# Patient Record
Sex: MALE | Race: WHITE | NOT HISPANIC OR LATINO | URBAN - METROPOLITAN AREA
[De-identification: names, ages, dates, MRNs, and addresses within clinical notes are randomized per-mention and may not be internally consistent; named-entity substitution may affect disease eponyms.]

---

## 2018-03-30 ENCOUNTER — OUTPATIENT (OUTPATIENT)
Dept: OUTPATIENT SERVICES | Facility: HOSPITAL | Age: 58
LOS: 1 days | End: 2018-03-30
Payer: COMMERCIAL

## 2018-03-30 ENCOUNTER — APPOINTMENT (OUTPATIENT)
Dept: MRI IMAGING | Facility: IMAGING CENTER | Age: 58
End: 2018-03-30
Payer: COMMERCIAL

## 2018-03-30 DIAGNOSIS — Z00.8 ENCOUNTER FOR OTHER GENERAL EXAMINATION: ICD-10-CM

## 2018-03-30 PROCEDURE — 74183 MRI ABD W/O CNTR FLWD CNTR: CPT | Mod: 26

## 2018-03-30 PROCEDURE — 74183 MRI ABD W/O CNTR FLWD CNTR: CPT

## 2018-03-30 PROCEDURE — A9585: CPT

## 2019-03-20 ENCOUNTER — APPOINTMENT (OUTPATIENT)
Dept: GASTROENTEROLOGY | Facility: CLINIC | Age: 59
End: 2019-03-20
Payer: COMMERCIAL

## 2019-03-20 VITALS
HEIGHT: 68 IN | HEART RATE: 63 BPM | DIASTOLIC BLOOD PRESSURE: 80 MMHG | RESPIRATION RATE: 15 BRPM | SYSTOLIC BLOOD PRESSURE: 124 MMHG | OXYGEN SATURATION: 98 % | TEMPERATURE: 97.6 F | BODY MASS INDEX: 28.79 KG/M2 | WEIGHT: 190 LBS

## 2019-03-20 DIAGNOSIS — I10 ESSENTIAL (PRIMARY) HYPERTENSION: ICD-10-CM

## 2019-03-20 DIAGNOSIS — Z78.9 OTHER SPECIFIED HEALTH STATUS: ICD-10-CM

## 2019-03-20 DIAGNOSIS — R10.11 RIGHT UPPER QUADRANT PAIN: ICD-10-CM

## 2019-03-20 PROCEDURE — 99204 OFFICE O/P NEW MOD 45 MIN: CPT

## 2019-03-20 RX ORDER — NEBIVOLOL HYDROCHLORIDE 5 MG/1
5 TABLET ORAL
Qty: 30 | Refills: 0 | Status: ACTIVE | COMMUNITY
Start: 2019-03-18

## 2019-03-20 RX ORDER — TELMISARTAN 40 MG/1
40 TABLET ORAL
Qty: 90 | Refills: 0 | Status: ACTIVE | COMMUNITY
Start: 2019-03-11

## 2019-03-20 NOTE — HISTORY OF PRESENT ILLNESS
[FreeTextEntry1] : Office consultation on 3/20/19.\par \par The patient is a 59-year-old man evaluated for consultation regarding chronic right upper quadrant abdominal pain. PMD: Dr. Kings Castanon.\par \par The patient has experienced intermittent right upper quadrant abdominal pain of 10 years duration. However, over the past approximate 3 years the pain has been constant. Of note, over the past 2 weeks the pain went away except recurred yesterday.\par \par For the past 3 years the patient has experienced a constant somewhat focal and localized right upper quadrant abdominal pain radiating around to the back. As noted, this is constant 24/7. He localizes the pain to a somewhat focal and localized area at the right upper quadrant several centimeters to the right of the midline just inferior to the costal margin. However, the pain is not at the costal margin or rib margin per se. The pain is mostly of moderate severity rated at 5/10. He indicates it can be exacerbated by motion, golfing, shoveling and other exertion. The patient was suspicious that there was a relation to caffeine products such as coffee as well as soda. Of note, upon stopping those items for the past 2 weeks he indicates the pain was gone. However, he had soda yesterday and the pain has recurred. He is experiencing the pain today both at the right upper quadrant as well as the right lower back area.\par \par He can occasionally experience some nausea but otherwise reports no other associated symptoms. Denies fever. Appetite is stable and he reports recent 10 pound weight decrease related to evaluation and treatment for hypertension and cardiac evaluation. He reports no associated vomiting. He denies any complaints of heartburn, regurgitation or abdominal pain at any other site. He typically has a bowel movement daily to every other day with occasional mild constipation. Denies any change in bowel habit, diarrhea or rectal bleeding.\par \par The patient has been evaluated by gastroenterology. He underwent an ultrasound of the abdomen on 3/29/18 which revealed a normal gallbladder, bile duct and visualize pancreas. There were no gallstones and CBD measured 2 mm. Mild splenomegaly was noted.\par \par MRI of the abdomen on 3/30/18 was normal. Liver, gallbladder, CBD and pancreas were normal. CBD measured 3 mm. Gallstone or bile duct stones were not detected.\par \par Review of 2/15/19 labs:\par -CBC: WBC 5100, hemoglobin 14.9, hematocrit 44.7, platelet count 222,000.\par -CMP: normal. Creatinine 22. Normal liver enzymes. ALT 14.\par -TSH 2.3.\par \par The patient reports no other history of digestive disease except as noted. The patient has never had a colonoscopy. There is no family history of colon cancer. The patient indicates that both parents underwent cholecystectomy.

## 2019-03-20 NOTE — REASON FOR VISIT
[Consultation] : a consultation visit [FreeTextEntry1] : for evaluation of right upper quadrant abdominal pain.

## 2019-03-20 NOTE — CONSULT LETTER
[Dear  ___] : Dear  [unfilled], [( Thank you for referring [unfilled] for consultation for _____ )] : Thank you for referring [unfilled] for consultation for [unfilled] [Consult Letter:] : I had the pleasure of evaluating your patient, [unfilled]. [Please see my note below.] : Please see my note below. [Consult Closing:] : Thank you very much for allowing me to participate in the care of this patient.  If you have any questions, please do not hesitate to contact me. [Sincerely,] : Sincerely, [FreeTextEntry3] : Dominic Middleton M.D. [FreeTextEntry2] : Dr. Kings Castanon

## 2019-03-20 NOTE — ASSESSMENT
[FreeTextEntry1] : Impression:\par \par #1 right upper quadrant abdominal pain-chronic somewhat focal and localized pain that has been constant and chronic of years duration. Atypical for gallbladder etiology-not experiencing typical biliary colic or typical biliary symptoms. In addition, also not suggestive of pancreatic etiology or PUD. Suspicion of abdominal wall etiology is prompted by exacerbation of pain associated with exertion and motion. However, no tenderness elicited at exam and therefore not able to assess with Carnet's test. He recently reports  cessation of pain related to avoidance of caffeine, coffee and soda although not clear if this is a coincidence. Alarm symptoms not reported.\par \par #2 colon cancer screening-advise colonoscopy.\par \par #3 hypertension.

## 2019-03-20 NOTE — PHYSICAL EXAM
[General Appearance - Alert] : alert [General Appearance - In No Acute Distress] : in no acute distress [General Appearance - Well Nourished] : well nourished [General Appearance - Well Developed] : well developed [General Appearance - Well-Appearing] : healthy appearing [Sclera] : the sclera and conjunctiva were normal [Oropharynx] : the oropharynx was normal [Neck Appearance] : the appearance of the neck was normal [Neck Cervical Mass (___cm)] : no neck mass was observed [Respiration, Rhythm And Depth] : normal respiratory rhythm and effort [Auscultation Breath Sounds / Voice Sounds] : lungs were clear to auscultation bilaterally [Exaggerated Use Of Accessory Muscles For Inspiration] : no accessory muscle use [Heart Rate And Rhythm] : heart rate was normal and rhythm regular [Heart Sounds] : normal S1 and S2 [Heart Sounds Gallop] : no gallops [Murmurs] : no murmurs [Heart Sounds Pericardial Friction Rub] : no pericardial rub [Edema] : there was no peripheral edema [Bowel Sounds] : normal bowel sounds [Abdomen Soft] : soft [Abdomen Tenderness] : non-tender [] : no hepato-splenomegaly [Abdomen Mass (___ Cm)] : no abdominal mass palpated [Abdomen Hernia] : no hernia was discovered [Cervical Lymph Nodes Enlarged Posterior Bilaterally] : posterior cervical [Cervical Lymph Nodes Enlarged Anterior Bilaterally] : anterior cervical [Supraclavicular Lymph Nodes Enlarged Bilaterally] : supraclavicular [No CVA Tenderness] : no ~M costovertebral angle tenderness [Abnormal Walk] : normal gait [Nail Clubbing] : no clubbing  or cyanosis of the fingernails [Skin Color & Pigmentation] : normal skin color and pigmentation [Oriented To Time, Place, And Person] : oriented to person, place, and time [Impaired Insight] : insight and judgment were intact [Affect] : the affect was normal [Mood] : the mood was normal [FreeTextEntry1] : Patient localizes site of pain to a focal and localized area at the right upper quadrant several centimeters lateral to midline and just inferior to costal margin. However, tenderness is not elicited to deep palpation. Rib tenderness is not elicited. There is no mass or hepatosplenomegaly. No change in discomfort is reported with tensing of abdominal muscles

## 2019-11-13 ENCOUNTER — APPOINTMENT (OUTPATIENT)
Age: 59
End: 2019-11-13
Payer: COMMERCIAL

## 2019-11-13 ENCOUNTER — TRANSCRIPTION ENCOUNTER (OUTPATIENT)
Age: 59
End: 2019-11-13

## 2019-11-13 VITALS — WEIGHT: 193 LBS | HEIGHT: 68 IN | BODY MASS INDEX: 29.25 KG/M2

## 2019-11-13 DIAGNOSIS — Z86.39 PERSONAL HISTORY OF OTHER ENDOCRINE, NUTRITIONAL AND METABOLIC DISEASE: ICD-10-CM

## 2019-11-13 DIAGNOSIS — Z78.9 OTHER SPECIFIED HEALTH STATUS: ICD-10-CM

## 2019-11-13 DIAGNOSIS — M25.561 PAIN IN RIGHT KNEE: ICD-10-CM

## 2019-11-13 PROCEDURE — 73562 X-RAY EXAM OF KNEE 3: CPT | Mod: RT

## 2019-11-13 PROCEDURE — 20610 DRAIN/INJ JOINT/BURSA W/O US: CPT | Mod: RT

## 2019-11-13 PROCEDURE — 99204 OFFICE O/P NEW MOD 45 MIN: CPT | Mod: 25

## 2019-11-13 PROCEDURE — 73560 X-RAY EXAM OF KNEE 1 OR 2: CPT | Mod: LT

## 2019-11-13 NOTE — ADDENDUM
[FreeTextEntry1] : This note was written by Annette Garcia on 11/13/2019 acting as scribe for Dr. Jarad Montoya M.D.\par \par I, Dr. Jarad Montoya M.D., have read and attest that all the information, medical decision making and discharge instructions within are true and accurate.

## 2019-11-13 NOTE — HISTORY OF PRESENT ILLNESS
[de-identified] : 59 year old male presents for initial evaluation of right knee pain for 10 years following a twisting injury while coming down a ladder. Patient reports he was diagnosed with a medial meniscus tear and subsequently underwent a right knee surgical arthroscopy partial medial meniscectomy in 2009. This was followed by right knee PT and steroid injections in the months following surgery, but none recently. For the past few years he has been self treating with a [employer] brace. His pain is located diffusely and mostly medial, which is described as sharp and dull intermittently. He endorses swelling, clicking, and popping. Patient takes the stairs one at a time using the handrail with difficulty. Reports discomfort walking more than 5 blocks, but does have to walk close to 10,000-12,000 steps per day as [place]. Today, he would like to discuss his treatment options with Dr. Montoya.

## 2019-11-13 NOTE — PROCEDURE
[de-identified] : RIGHT KNEE CORTISONE INJECTION\par Discussed at length with the patient the planned steroid and lidocaine injection. The risks, benefits, convalescence and alternatives were reviewed. The possible side effects discussed included but were not limited to: pain, swelling, heat and redness. There symptoms are generally mild but if they are extensive then contact the office. Giving pain relievers by mouth such as NSAID’s or Tylenol can generally treat the reactions to  steroid and lidocaine. Rare cases of infection have been noted. Rash, hives and itching may occur post injection. If you have muscle pain or cramps, flushing and or swelling of the face, rapid heart beat, nausea, dizziness, fever, chills, headache, difficulty breathing, swelling in the arms or legs, or have a prickly feeling of your skin, contact a health care provider immediately.\par  \par Following this discussion, the knee was prepped with betadine and under sterile conditions 9 cc of 1% lidocaine and 1 cc (40 mg) of Depo-Medrol were injected with a 21 gauge needle. The needle was introduced into the joint, aspiration was performed to ensure intra-articular placement and the medication was injected. Upon withdrawal of the needle the site was cleaned with alcohol and a bandaid applied. The patient tolerated the injection well and there were no adverse effects. Post injection instructions included no strenuous activity for 24 hours, cryotherapy and if there are any adverse effects to contact the office.

## 2019-11-13 NOTE — DISCUSSION/SUMMARY
[de-identified] : Discussed at length the nature of the patients condition. Their right knee symptoms appear secondary to degenerative arthritis with varus deformity. We reviewed operative and nonoperative treatment. While I believe he would eventually benefit from a right TKR, he is hesitant to have surgery at this time. Therefore, we will continue nonoperatively. We will seek authorization for RIGHT knee Euflexxa injections. Once we receive authorization, we will proceed accordingly. In the interim, patient was given a RIGHT knee cortisone injection today as detailed above for symptomatic relief. I also suggested PT and Advil or Aleve prn. They can continue activities as tolerated.\par

## 2019-11-13 NOTE — PHYSICAL EXAM
[de-identified] : General appearance: well nourished and hydrated, pleasant, alert and oriented x 3, cooperative.\par HEENT: Normocephalic, EOM intact, Nasal septum midline, Oral cavity clear, External auditory canal clear.\par Cardiovascular: no apparent abnormalities, no lower leg edema, no varicosities, pedal pulses are palpable.\par Lymphatics Lymph nodes: none palpated, Lymphedema: not present.\par Neurologic: sensation is normal, no muscle weakness in upper or lower extremities, patella tendon reflexes intact .\par Dermatologic no apparent skin lesions, moist, warm, no rash.\par Spine: cervical spine appears normal and moves freely, thoracic spine appears normal and moves freely, lumbosacral spine appears normal and moves freely.\par Gait: right lateral thrust\par \par Left knee\par Inspection: no effusion or erythema.\par Wounds: none.\par Alignment: normal.\par Palpation: no specific tenderness on palpation.\par ROM active (in degrees): 0-135\par Ligamentous laxity: all ligaments appear stable,, negative ant. drawer test, negative post. drawer test, stable to varus stress test, stable to valgus stress test. negative Lachman's test, negative pivot shift test\par Meniscal Test: negative McMurrays, negative Michael.\par Patellofemoral Alignment Test: Q angle-, normal.\par Muscle Test: good quad strength.\par Leg examination: calf is soft and non-tender.\par \par Right knee\par Inspection: mild effusion, popliteal cyst noted \par Wounds: none.\par Alignment: 10 degrees varus alignment \par Palpation: medial tenderness on palpation.\par ROM active (in degrees): 0-125 with crepitus and pain through the arc of motion \par Ligamentous laxity: all ligaments appear stable,, negative ant. drawer test, negative post. drawer test, stable to varus stress test, stable to valgus stress test. negative Lachman's test, negative pivot shift test\par Meniscal Test: negative McMurrays, negative Michael.\par Patellofemoral Alignment Test: Q angle-, normal.\par Muscle Test: good quad strength.\par Leg examination: calf is soft and non-tender.\par \par Left hip\par Inspection: No swelling or ecchymosis.\par Wounds: none.\par Palpation: non-tender.\par Stability: no instability.\par Strength: 5/5 all motor groups.\par ROM: no pain with FROM.\par Leg length: equal.\par \par Right hip\par Inspection: No swelling or ecchymosis.\par Wounds: none.\par Palpation: non-tender.\par Stability: no instability.\par Strength: 5/5 all motor groups.\par ROM: no pain with FROM.\par Leg length: equal.\par \par Left ankle\par Inspection: no erythema noted, no swelling noted.\par Palpation: no pain on palpation .\par ROM: FROM without crepitus.\par Muscle strength: 5/5.\par Stability: no instability noted.\par \par Right ankle\par Inspection: no erythema noted, no swelling noted.\par ROM: FROM without crepitus.\par Palpation: no pain on palpation .\par Muscle strength: 5/5.\par Stability: no instability noted.\par \par Left foot\par Inspection: color, texture and turgor are normal.\par ROM: full range of motion of all joints without pain or crepitus.\par Palpation: no tenderness.\par Stability: no instability noted.\par \par Right foot\par Inspection: color, texture and turgor are normal.\par ROM: full range of motion of all joints without pain or crepitus.\par Palpation: no tenderness.\par Stability: no instability noted.\par \par Left shoulder\par Inspection: no muscle asymmetry, no atrophy.\par Palpation: no tenderness noted, ACJ non-tender.\par ROM: full active ROM, full passive ROM.\par Strength testing): anterior deltoid, supraspinatus, infraspinatus, subscapularis all 5/5.\par Stability test: ant. apprehension negative, post. apprehension negative, relocation test negative.\par Impingement Test: negative NEER.\par \par Right shoulder\par Inspection: no muscle asymmetry, no atrophy.\par Palpation: no tenderness noted, ACJ non-tender.\par ROM: full active ROM, full passive ROM.\par Strength testing): anterior deltoid, supraspinatus, infraspinatus, subscapularis all 5/5.\par Stability test: ant. apprehension negative, post. apprehension negative, relocation test negative.\par Impingement Test: negative NEER.\par Surgical Wounds: none.\par \par Left elbow\par Inspection: negative swelling.\par Wounds: none.\par Palpation: non-tender.\par ROM: full ROM.\par Strength: 5/5 all groups.\par Stability: no instability.\par Mass: none.\par \par Right elbow\par Inspection: negative swelling.\par Wounds: none.\par Palpation: non-tender.\par ROM: full ROM.\par Strength: 5/5 all groups.\par Stability: no instability.\par Mass: none.\par \par Left wrist\par Inspection: negative swelling.\par Wound: none.\par Palpation (bone): no tenderness.\par ROM: full ROM.\par Strength: full , good.\par \par Right wrist\par Inspection: negative swelling.\par Wound: none.\par Palpation (bone): no tenderness.\par ROM: full ROM.\par Strength: full , good.\par \par Left hand\par Inspection: no skin changes, normal appearance.\par Wounds: none.\par Strength: full , able to make full fist.\par Sensation: light touch intact all fingers and thumb.\par Vascular: good capillary refill < 3 seconds, all fingers and thumb.\par Mass: none.\par \par Right hand\par Inspection: no skin changes, normal appearance. \par Wounds: none.\par Palpation: non-tender throughout.\par Strength: full , able to make full fist.\par Sensation: light touch intact all fingers and thumb.\par Vascular: good capillary refill < 3 seconds, all fingers and thumb.\par Mass: none.\par   [de-identified] : Right knee xrays, standing AP/Lateral, Merchant films, and 45 degree PA standing view taken at the office today show diffuse tricompartmental degenerative arthritis, medial joint space narrowing, marginal osteophytes, sclerosis, bone on bone, patellofemoral joint space narrowing with osteophyte formation consistent with patellofemoral arthritis, Kellgren and Eduardo grade 4, varus deformity  \par \par Left knee xray merchant view, taken at the office today demonstrates good joint space and a well centered patella.

## 2019-12-11 ENCOUNTER — APPOINTMENT (OUTPATIENT)
Dept: ORTHOPEDIC SURGERY | Facility: CLINIC | Age: 59
End: 2019-12-11
Payer: COMMERCIAL

## 2019-12-11 VITALS — HEIGHT: 68 IN | WEIGHT: 193 LBS | BODY MASS INDEX: 29.25 KG/M2

## 2019-12-11 PROCEDURE — 20610 DRAIN/INJ JOINT/BURSA W/O US: CPT | Mod: RT

## 2019-12-11 NOTE — PROCEDURE
[de-identified] : Euflexxa # 1 Right Knee\par Discussed at length with the patient the planned Euflexxa injection. The risks, benefits, convalescence and alternatives were reviewed. The possible side effects discussed included but were not limited to: pain, swelling, heat and redness. There symptoms are generally mild but if they are extensive then contact the office. Giving pain relievers by mouth such as NSAID’s or Tylenol can generally treat the reactions to Euflexxa. Rare cases of infection have been noted. Rash, hives and itching may occur post injection. If you have muscle pain or cramps, flushing and or swelling of the face, rapid heart beat, nausea, dizziness, fever, chills, headache, difficulty breathing, swelling in the arms or legs, or have a prickly feeling of your skin, contact a health care provider immediately.\par \par Following this discussion, the knee was prepped with betadine and under sterile condition the Euflexxa injection was performed with a 22 gauge needle. The needle was introduced into the joint, aspiration was performed to ensure intra-articular placement and the medication was injected. Upon withdrawal of the needle the site was cleaned with alcohol and a bandaid applied. The patient tolerated the injection well and there were no adverse effects. Post injection instructions included no strenuous activity for 24 hours, cryotherapy and if there are any adverse effects to contact the office.\par

## 2019-12-18 ENCOUNTER — APPOINTMENT (OUTPATIENT)
Dept: ORTHOPEDIC SURGERY | Facility: CLINIC | Age: 59
End: 2019-12-18
Payer: COMMERCIAL

## 2019-12-18 VITALS — WEIGHT: 193 LBS | HEIGHT: 68 IN | BODY MASS INDEX: 29.25 KG/M2

## 2019-12-18 PROCEDURE — 20610 DRAIN/INJ JOINT/BURSA W/O US: CPT | Mod: RT

## 2019-12-18 NOTE — PROCEDURE
[de-identified] : Euflexxa # 2 Right knee\par Discussed at length with the patient the planned Euflexxa injection. The risks, benefits, convalescence and alternatives were reviewed. The possible side effects discussed included but were not limited to: pain, swelling, heat and redness. There symptoms are generally mild but if they are extensive then contact the office. Giving pain relievers by mouth such as NSAID’s or Tylenol can generally treat the reactions to Euflexxa. Rare cases of infection have been noted. Rash, hives and itching may occur post injection. If you have muscle pain or cramps, flushing and or swelling of the face, rapid heart beat, nausea, dizziness, fever, chills, headache, difficulty breathing, swelling in the arms or legs, or have a prickly feeling of your skin, contact a health care provider immediately.\par \par Following this discussion, the knee was prepped with betadine and under sterile condition the Euflexxa injection was performed with a 22 gauge needle. The needle was introduced into the joint, aspiration was performed to ensure intra-articular placement and the medication was injected. Upon withdrawal of the needle the site was cleaned with alcohol and a bandaid applied. The patient tolerated the injection well and there were no adverse effects. Post injection instructions included no strenuous activity for 24 hours, cryotherapy and if there are any adverse effects to contact the office.\par

## 2019-12-27 ENCOUNTER — APPOINTMENT (OUTPATIENT)
Dept: ORTHOPEDIC SURGERY | Facility: CLINIC | Age: 59
End: 2019-12-27
Payer: COMMERCIAL

## 2019-12-27 VITALS — BODY MASS INDEX: 29.25 KG/M2 | HEIGHT: 68 IN | WEIGHT: 193 LBS

## 2019-12-27 PROCEDURE — 20610 DRAIN/INJ JOINT/BURSA W/O US: CPT | Mod: RT

## 2019-12-27 NOTE — PROCEDURE
[de-identified] : Euflexxa # 3 Right Knee\par Discussed at length with the patient the planned Euflexxa injection. The risks, benefits, convalescence and alternatives were reviewed. The possible side effects discussed included but were not limited to: pain, swelling, heat and redness. There symptoms are generally mild but if they are extensive then contact the office. Giving pain relievers by mouth such as NSAID’s or Tylenol can generally treat the reactions to Euflexxa. Rare cases of infection have been noted. Rash, hives and itching may occur post injection. If you have muscle pain or cramps, flushing and or swelling of the face, rapid heart beat, nausea, dizziness, fever, chills, headache, difficulty breathing, swelling in the arms or legs, or have a prickly feeling of your skin, contact a health care provider immediately.\par \par Following this discussion, the knee was prepped with betadine and under sterile condition the Euflexxa injection was performed with a 22 gauge needle. The needle was introduced into the joint, aspiration was performed to ensure intra-articular placement and the medication was injected. Upon withdrawal of the needle the site was cleaned with alcohol and a bandaid applied. The patient tolerated the injection well and there were no adverse effects. Post injection instructions included no strenuous activity for 24 hours, cryotherapy and if there are any adverse effects to contact the office.\par

## 2020-03-18 ENCOUNTER — APPOINTMENT (OUTPATIENT)
Dept: ORTHOPEDIC SURGERY | Facility: CLINIC | Age: 60
End: 2020-03-18

## 2020-07-15 ENCOUNTER — APPOINTMENT (OUTPATIENT)
Dept: ORTHOPEDIC SURGERY | Facility: CLINIC | Age: 60
End: 2020-07-15
Payer: COMMERCIAL

## 2020-07-15 VITALS — TEMPERATURE: 97.2 F

## 2020-07-15 PROCEDURE — 73560 X-RAY EXAM OF KNEE 1 OR 2: CPT | Mod: LT

## 2020-07-15 PROCEDURE — 99213 OFFICE O/P EST LOW 20 MIN: CPT | Mod: 25

## 2020-07-15 PROCEDURE — 73564 X-RAY EXAM KNEE 4 OR MORE: CPT | Mod: RT

## 2020-07-15 PROCEDURE — 20610 DRAIN/INJ JOINT/BURSA W/O US: CPT | Mod: RT

## 2020-07-15 NOTE — HISTORY OF PRESENT ILLNESS
[de-identified] : 60 year old male presents for follow-up evaluation of right knee pain. He received a Cortisone injection in November 2019 and Euflexxa in December 2019 with noted improvement. He was attending PT regularly until COVID-19 which he discontinued. He notes increased pain and is requesting Euflexxa and Cortisone injection to help relieve his  pain after discontinuing PT. Patient denies taking any medications for any pain he might experience.

## 2020-07-15 NOTE — ADDENDUM
[FreeTextEntry1] : This note was written by Wayne Fraser on 07/15/2020 acting as scribe for Dr. Jarad Montoya M.D.\par \par I, Dr. Jarad Montoya, have read and attest that all the information, medical decision making and discharge instructions within are true and accurate.

## 2020-07-15 NOTE — DISCUSSION/SUMMARY
[de-identified] : Discussed at length the nature of the patients condition. Their right knee symptoms appear secondary to degenerative arthritis with varus deformity. We reviewed operative and nonoperative treatment. While I believe he would eventually benefit from a right TKR, he is hesitant to have surgery at this time. Therefore, we will continue nonoperatively. We will seek authorization for RIGHT knee Euflexxa injections. Once we receive authorization, we will proceed accordingly. In the interim, patient was given a RIGHT knee cortisone injection today as detailed above for symptomatic relief. I also suggested PT and Advil or Aleve prn. They can continue activities as tolerated.\par

## 2020-07-15 NOTE — PHYSICAL EXAM
[de-identified] : General appearance: well nourished and hydrated, pleasant, alert and oriented x 3, cooperative.\par HEENT: Normocephalic, EOM intact, Nasal septum midline, Oral cavity clear, External auditory canal clear.\par Cardiovascular: no apparent abnormalities, no lower leg edema, no varicosities, pedal pulses are palpable.\par Lymphatics Lymph nodes: none palpated, Lymphedema: not present.\par Neurologic: sensation is normal, no muscle weakness in upper or lower extremities, patella tendon reflexes intact .\par Dermatologic no apparent skin lesions, moist, warm, no rash.\par Spine: cervical spine appears normal and moves freely, thoracic spine appears normal and moves freely, lumbosacral spine appears normal and moves freely.\par Gait: right lateral thrust\par \par Right knee\par Inspection: mild effusion, popliteal cyst noted \par Wounds: none.\par Alignment: 10 degrees varus alignment \par Palpation: medial tenderness on palpation.\par ROM active (in degrees): 0-125 with crepitus and pain through the arc of motion \par Ligamentous laxity: all ligaments appear stable,, negative ant. drawer test, negative post. drawer test, stable to varus stress test, stable to valgus stress test. negative Lachman's test, negative pivot shift test\par Meniscal Test: negative McMurrays, negative Michael.\par Patellofemoral Alignment Test: Q angle-, normal.\par Muscle Test: good quad strength.\par Leg examination: calf is soft and non-tender. [de-identified] : Right knee xrays, standing AP/Lateral, Merchant films, and 45 degree PA standing view taken at the office today show diffuse tricompartmental degenerative arthritis, medial joint space narrowing, marginal osteophytes, sclerosis, bone on bone, patellofemoral joint space narrowing with osteophyte formation consistent with patellofemoral arthritis, Kellgren and Eduardo grade 4, varus deformity  \par \par Left knee xray merchant view, taken at the office today demonstrates good joint space and a well centered patella.

## 2020-08-12 ENCOUNTER — APPOINTMENT (OUTPATIENT)
Dept: ORTHOPEDIC SURGERY | Facility: CLINIC | Age: 60
End: 2020-08-12
Payer: COMMERCIAL

## 2020-08-12 VITALS — BODY MASS INDEX: 29.25 KG/M2 | HEIGHT: 68 IN | WEIGHT: 193 LBS

## 2020-08-12 PROCEDURE — 20610 DRAIN/INJ JOINT/BURSA W/O US: CPT | Mod: RT

## 2020-08-12 NOTE — PROCEDURE
[de-identified] : Orthovisc #1 Right knee\par \par Discussed at length with the patient the planned Orthovisc injection. The risks, benefits, convalescence and alternatives were reviewed. The possible side effects discussed included but were not limited to: pain, swelling, heat and redness. There symptoms are generally mild but if they are extensive then contact the office. Giving pain relievers by mouth such as NSAID’s or Tylenol can generally treat the reactions to Orthovisc. Rare cases of infection have been noted. Rash, hives and itching may occur post injection. If you have muscle pain or cramps, flushing and or swelling of the face, rapid heart beat, nausea, dizziness, fever, chills, headache, difficulty breathing, swelling in the arms or legs, or have a prickly feeling of your skin, contact a health care provider immediately.\par \par Following this discussion, the knee was prepped with betadine and under sterile condition the Orthovisc injection was performed with a 21 gauge needle. The needle was introduced into the joint, aspiration was performed to ensure intra-articular placement and the medication was injected. Upon withdrawal of the needle the site was cleaned with alcohol and a bandaid applied. The patient tolerated the injection well and there were no adverse effects. Post injection instructions included no strenuous activity for 24 hours, cryotherapy and if there are any adverse effects to contact the office.\par

## 2020-08-19 ENCOUNTER — APPOINTMENT (OUTPATIENT)
Dept: ORTHOPEDIC SURGERY | Facility: CLINIC | Age: 60
End: 2020-08-19
Payer: COMMERCIAL

## 2020-08-19 VITALS — BODY MASS INDEX: 29.25 KG/M2 | WEIGHT: 193 LBS | HEIGHT: 68 IN

## 2020-08-19 PROCEDURE — 20610 DRAIN/INJ JOINT/BURSA W/O US: CPT | Mod: RT

## 2020-08-19 NOTE — PROCEDURE
[de-identified] : Orthovisc # 2 Right Knee\par Discussed at length with the patient the planned Orthovisc injection. The risks, benefits, convalescence and alternatives were reviewed. The possible side effects discussed included but were not limited to: pain, swelling, heat and redness. There symptoms are generally mild but if they are extensive then contact the office. Giving pain relievers by mouth such as NSAID’s or Tylenol can generally treat the reactions to Orthovisc. Rare cases of infection have been noted. Rash, hives and itching may occur post injection. If you have muscle pain or cramps, flushing and or swelling of the face, rapid heart beat, nausea, dizziness, fever, chills, headache, difficulty breathing, swelling in the arms or legs, or have a prickly feeling of your skin, contact a health care provider immediately.\par \par Following this discussion, the knee was prepped with betadine and under sterile condition the Orthovisc injection was performed with a 22 gauge needle. The needle was introduced into the joint, aspiration was performed to ensure intra-articular placement and the medication was injected. Upon withdrawal of the needle the site was cleaned with alcohol and a bandaid applied. The patient tolerated the injection well and there were no adverse effects. Post injection instructions included no strenuous activity for 24 hours, cryotherapy and if there are any adverse effects to contact the office.\par

## 2020-08-26 ENCOUNTER — APPOINTMENT (OUTPATIENT)
Dept: ORTHOPEDIC SURGERY | Facility: CLINIC | Age: 60
End: 2020-08-26
Payer: COMMERCIAL

## 2020-08-26 VITALS — WEIGHT: 193 LBS | HEIGHT: 68 IN | BODY MASS INDEX: 29.25 KG/M2

## 2020-08-26 PROCEDURE — 20610 DRAIN/INJ JOINT/BURSA W/O US: CPT | Mod: RT

## 2020-08-26 NOTE — PROCEDURE
[de-identified] : Orthovisc # 3 Right Knee\par Discussed at length with the patient the planned Orthovisc injection. The risks, benefits, convalescence and alternatives were reviewed. The possible side effects discussed included but were not limited to: pain, swelling, heat and redness. There symptoms are generally mild but if they are extensive then contact the office. Giving pain relievers by mouth such as NSAID’s or Tylenol can generally treat the reactions to Orthovisc. Rare cases of infection have been noted. Rash, hives and itching may occur post injection. If you have muscle pain or cramps, flushing and or swelling of the face, rapid heart beat, nausea, dizziness, fever, chills, headache, difficulty breathing, swelling in the arms or legs, or have a prickly feeling of your skin, contact a health care provider immediately.\par \par Following this discussion, the knee was prepped with betadine and under sterile condition the Orthovisc injection was performed with a 22 gauge needle. The needle was introduced into the joint, aspiration was performed to ensure intra-articular placement and the medication was injected. Upon withdrawal of the needle the site was cleaned with alcohol and a bandaid applied. The patient tolerated the injection well and there were no adverse effects. Post injection instructions included no strenuous activity for 24 hours, cryotherapy and if there are any adverse effects to contact the office.\par

## 2021-01-27 ENCOUNTER — APPOINTMENT (OUTPATIENT)
Dept: ORTHOPEDIC SURGERY | Facility: CLINIC | Age: 61
End: 2021-01-27
Payer: COMMERCIAL

## 2021-01-27 PROCEDURE — 99072 ADDL SUPL MATRL&STAF TM PHE: CPT

## 2021-01-27 PROCEDURE — 73564 X-RAY EXAM KNEE 4 OR MORE: CPT | Mod: RT

## 2021-01-27 PROCEDURE — 20610 DRAIN/INJ JOINT/BURSA W/O US: CPT | Mod: RT

## 2021-01-27 PROCEDURE — 73560 X-RAY EXAM OF KNEE 1 OR 2: CPT | Mod: LT

## 2021-01-27 PROCEDURE — 99212 OFFICE O/P EST SF 10 MIN: CPT | Mod: 25

## 2021-01-27 NOTE — DISCUSSION/SUMMARY
[de-identified] : Discussed at length the nature of the patients condition. Their right knee symptoms appear secondary to degenerative arthritis with varus deformity. I reviewed x-ray films with them. \par \par We reviewed operative and nonoperative treatment, including observation, NSAIDS, injection(s), physical therapy, and surgical intervention. We will continue non-operatively. \par \par Since he has had a good response in the past, we will seek authorization for R knee Euflexxa injections. Once we receive authorization, we will proceed accordingly. Patient was given a R knee cortisone injection as detailed above for symptomatic relief. \par \par I have suggested a home exercise program, RICE therapy and Advil or Aleve prn.\par \par He can continue activities as tolerated. All questions answered, understanding verbalized. Patient in agreement with plan of care. Discussed with the patient that if his symptoms continue to persist, and becomes a functional disability while impacting his quality of life, he will eventually have to consider a total knee replacement.

## 2021-01-27 NOTE — HISTORY OF PRESENT ILLNESS
[de-identified] : JOHN ARITA is a 60 year old male who presents for follow-up evaluation of right chronic knee pain, >3 months. He has been undergoing nonoperative treatment with Orthovisc injections last done in Aug 2020, with no much relief compared to past series of Euflexxa injections. Patient denies the use of any pain medications.

## 2021-01-27 NOTE — PHYSICAL EXAM
[de-identified] : General appearance: well nourished and hydrated, pleasant, alert and oriented x 3, cooperative.\par HEENT: Normocephalic, EOM intact, Nasal septum midline, Oral cavity clear, External auditory canal clear.\par Cardiovascular: no apparent abnormalities, no lower leg edema, no varicosities, pedal pulses are palpable.\par Lymphatics Lymph nodes: none palpated, Lymphedema: not present.\par Neurologic: sensation is normal, no muscle weakness in upper or lower extremities, patella tendon reflexes intact .\par Dermatologic no apparent skin lesions, moist, warm, no rash.\par Spine: cervical spine appears normal and moves freely, thoracic spine appears normal and moves freely, lumbosacral spine appears normal and moves freely.\par Gait: right lateral thrust\par \par Right knee\par Inspection: mild effusion\par Wounds: none.\par Alignment: 10 degrees varus alignment \par Palpation: medial tenderness on palpation.\par ROM active (in degrees): 0-125 with crepitus and pain through the arc of motion \par Ligamentous laxity: all ligaments appear stable,, negative ant. drawer test, negative post. drawer test, stable to varus stress test, stable to valgus stress test. negative Lachman's test, negative pivot shift test\par Meniscal Test: negative McMurrays, negative Michael.\par Patellofemoral Alignment Test: Q angle-, normal.\par Muscle Test: good quad strength.\par Leg examination: calf is soft and non-tender. [de-identified] : Right knee xrays, standing AP/Lateral, Merchant films, and 45 degree PA standing view taken at the office today show diffuse tricompartmental degenerative arthritis, medial joint space narrowing, marginal osteophytes, sclerosis, bone on bone, patellofemoral joint space narrowing with osteophyte formation consistent with patellofemoral arthritis, Kellgren and Eduardo grade 4, varus deformity  \par \par Left knee xray merchant view, taken at the office today demonstrates good joint space and a well centered patella.

## 2021-01-27 NOTE — PROCEDURE
[de-identified] : RIGHT KNEE CORTISONE INJECTION\par Discussed at length with the patient the planned steroid and lidocaine injection. The risks, benefits, convalescence and alternatives were reviewed. The possible side effects discussed included but were not limited to: pain, swelling, heat and redness. These symptoms are generally mild but if they are extensive then contact the office. Giving pain relievers by mouth such as NSAID’s or Tylenol can generally treat the reactions to steroid and lidocaine. Rare cases of infection have been noted. Rash, hives and itching may occur post injection. If you have muscle pain or cramps, flushing and or swelling of the face, rapid heart beat, nausea, dizziness, fever, chills, headache, difficulty breathing, swelling in the arms or legs, or have a prickly feeling of your skin, contact a health care provider immediately.\par \par Following this discussion, the knee was prepped with betadine and under sterile conditions 9 cc of 1% lidocaine and 1 cc depo-medrol were injected with a 21 gauge needle. The needle was introduced into the joint, aspiration was performed to ensure intra-articular placement and the medication was injected. Upon withdrawal of the needle the site was cleaned with alcohol and a bandaid applied. The patient tolerated the injection well and there were no adverse effects. Post injection instructions included no strenuous activity for 24 hours, cryotherapy and if there are any adverse effects to contact the office

## 2021-01-27 NOTE — ADDENDUM
[FreeTextEntry1] : This note was written by Wayne Fraser on 01/27/2021 acting as scribe for Dr. Jarad Montoya M.D.\par \par I, Dr. Jarad Montoya, have read and attest that all the information, medical decision making and discharge instructions within are true and accurate.

## 2021-02-09 VITALS — WEIGHT: 193 LBS | HEIGHT: 68 IN | BODY MASS INDEX: 29.25 KG/M2

## 2021-02-10 ENCOUNTER — APPOINTMENT (OUTPATIENT)
Dept: ORTHOPEDIC SURGERY | Facility: CLINIC | Age: 61
End: 2021-02-10
Payer: COMMERCIAL

## 2021-02-10 PROCEDURE — 20610 DRAIN/INJ JOINT/BURSA W/O US: CPT | Mod: RT

## 2021-02-10 PROCEDURE — 99072 ADDL SUPL MATRL&STAF TM PHE: CPT

## 2021-02-10 NOTE — PROCEDURE
[de-identified] : Euflexxa # 1 Right knee\par Discussed at length with the patient the planned Euflexxa injection. The risks, benefits, convalescence and alternatives were reviewed. The possible side effects discussed included but were not limited to: pain, swelling, heat and redness. There symptoms are generally mild but if they are extensive then contact the office. Giving pain relievers by mouth such as NSAID’s or Tylenol can generally treat the reactions to Euflexxa. Rare cases of infection have been noted. Rash, hives and itching may occur post injection. If you have muscle pain or cramps, flushing and or swelling of the face, rapid heart beat, nausea, dizziness, fever, chills, headache, difficulty breathing, swelling in the arms or legs, or have a prickly feeling of your skin, contact a health care provider immediately.\par \par Following this discussion, the knee was prepped with betadine and under sterile condition the Euflexxa injection was performed with a 22 gauge needle. The needle was introduced into the joint, aspiration was performed to ensure intra-articular placement and the medication was injected. Upon withdrawal of the needle the site was cleaned with alcohol and a bandaid applied. The patient tolerated the injection well and there were no adverse effects. Post injection instructions included no strenuous activity for 24 hours, cryotherapy and if there are any adverse effects to contact the office.\par

## 2021-02-16 VITALS — HEIGHT: 68 IN | BODY MASS INDEX: 29.25 KG/M2 | WEIGHT: 193 LBS

## 2021-02-17 ENCOUNTER — APPOINTMENT (OUTPATIENT)
Dept: ORTHOPEDIC SURGERY | Facility: CLINIC | Age: 61
End: 2021-02-17
Payer: COMMERCIAL

## 2021-02-17 PROCEDURE — 99072 ADDL SUPL MATRL&STAF TM PHE: CPT

## 2021-02-17 PROCEDURE — 20610 DRAIN/INJ JOINT/BURSA W/O US: CPT | Mod: RT

## 2021-02-17 NOTE — PROCEDURE
[de-identified] : Right Knee Euflexxa #2\par Discussed at length with the patient the planned Euflexxa injection. The risks, benefits, convalescence and alternatives were reviewed. The possible side effects discussed included but were not limited to: pain, swelling, heat and redness. There symptoms are generally mild but if they are extensive then contact the office. Giving pain relievers by mouth such as NSAID’s or Tylenol can generally treat the reactions to Euflexxa. Rare cases of infection have been noted. Rash, hives and itching may occur post injection. If you have muscle pain or cramps, flushing and or swelling of the face, rapid heart beat, nausea, dizziness, fever, chills, headache, difficulty breathing, swelling in the arms or legs, or have a prickly feeling of your skin, contact a health care provider immediately.\par \par Following this discussion, the knee was prepped with betadine and under sterile condition the Euflexxa injection was performed with a 21 gauge needle. The needle was introduced into the joint, aspiration was performed to ensure intra-articular placement and the medication was injected. Upon withdrawal of the needle the site was cleaned with alcohol and a bandaid applied. The patient tolerated the injection well and there were no adverse effects. Post injection instructions included no strenuous activity for 24 hours, cryotherapy and if there are any adverse effects to contact the office.\par

## 2021-02-22 VITALS — WEIGHT: 193 LBS | HEIGHT: 68 IN | BODY MASS INDEX: 29.25 KG/M2

## 2021-02-24 ENCOUNTER — APPOINTMENT (OUTPATIENT)
Dept: ORTHOPEDIC SURGERY | Facility: CLINIC | Age: 61
End: 2021-02-24
Payer: COMMERCIAL

## 2021-02-24 PROCEDURE — 20610 DRAIN/INJ JOINT/BURSA W/O US: CPT | Mod: RT

## 2021-02-24 PROCEDURE — 99072 ADDL SUPL MATRL&STAF TM PHE: CPT

## 2021-02-24 NOTE — PROCEDURE
[de-identified] : Right Knee Euflexxa #3\par Discussed at length with the patient the planned Euflexxa injection. The risks, benefits, convalescence and alternatives were reviewed. The possible side effects discussed included but were not limited to: pain, swelling, heat and redness. There symptoms are generally mild but if they are extensive then contact the office. Giving pain relievers by mouth such as NSAID’s or Tylenol can generally treat the reactions to Euflexxa. Rare cases of infection have been noted. Rash, hives and itching may occur post injection. If you have muscle pain or cramps, flushing and or swelling of the face, rapid heart beat, nausea, dizziness, fever, chills, headache, difficulty breathing, swelling in the arms or legs, or have a prickly feeling of your skin, contact a health care provider immediately.\par \par Following this discussion, the knee was prepped with betadine and under sterile condition the Euflexxa injection was performed with a 21 gauge needle. The needle was introduced into the joint, aspiration was performed to ensure intra-articular placement and the medication was injected. Upon withdrawal of the needle the site was cleaned with alcohol and a bandaid applied. The patient tolerated the injection well and there were no adverse effects. Post injection instructions included no strenuous activity for 24 hours, cryotherapy and if there are any adverse effects to contact the office.\par

## 2021-04-12 ENCOUNTER — TRANSCRIPTION ENCOUNTER (OUTPATIENT)
Age: 61
End: 2021-04-12

## 2021-06-24 ENCOUNTER — TRANSCRIPTION ENCOUNTER (OUTPATIENT)
Age: 61
End: 2021-06-24

## 2021-06-25 ENCOUNTER — APPOINTMENT (OUTPATIENT)
Dept: ORTHOPEDIC SURGERY | Facility: CLINIC | Age: 61
End: 2021-06-25
Payer: COMMERCIAL

## 2021-06-25 VITALS — HEIGHT: 68 IN | BODY MASS INDEX: 29.25 KG/M2 | WEIGHT: 193 LBS

## 2021-06-25 DIAGNOSIS — M21.161 VARUS DEFORMITY, NOT ELSEWHERE CLASSIFIED, RIGHT KNEE: ICD-10-CM

## 2021-06-25 PROCEDURE — 73560 X-RAY EXAM OF KNEE 1 OR 2: CPT | Mod: LT

## 2021-06-25 PROCEDURE — 99072 ADDL SUPL MATRL&STAF TM PHE: CPT

## 2021-06-25 PROCEDURE — 73564 X-RAY EXAM KNEE 4 OR MORE: CPT | Mod: RT

## 2021-06-25 PROCEDURE — 99214 OFFICE O/P EST MOD 30 MIN: CPT

## 2021-06-25 NOTE — PHYSICAL EXAM
[de-identified] : General appearance: well nourished and hydrated, pleasant, alert and oriented x 3, cooperative.\par HEENT: Normocephalic, EOM intact, Nasal septum midline, Oral cavity clear, External auditory canal clear.\par Cardiovascular: no apparent abnormalities, no lower leg edema, no varicosities, pedal pulses are palpable.\par Lymphatics Lymph nodes: none palpated, Lymphedema: not present.\par Neurologic: sensation is normal, no muscle weakness in upper or lower extremities, patella tendon reflexes intact .\par Dermatologic no apparent skin lesions, moist, warm, no rash.\par Spine: cervical spine appears normal and moves freely, thoracic spine appears normal and moves freely, lumbosacral spine appears normal and moves freely.\par Gait: right lateral thrust\par \par Right knee\par Inspection: mild effusion\par Wounds: none.\par Alignment: 10 degrees varus alignment \par Palpation: medial tenderness on palpation.\par ROM active (in degrees): 0-125 with crepitus and pain through the arc of motion \par Ligamentous laxity: all ligaments appear stable,, negative ant. drawer test, negative post. drawer test, stable to varus stress test, stable to valgus stress test. negative Lachman's test, negative pivot shift test\par Meniscal Test: negative McMurrays, negative Michael.\par Patellofemoral Alignment Test: Q angle-, normal.\par Muscle Test: good quad strength.\par Leg examination: calf is soft and non-tender. [de-identified] : Right knee xrays, standing AP/Lateral, Merchant films, and 45 degree PA standing view taken at the office today show diffuse tricompartmental degenerative arthritis, medial joint space narrowing, marginal osteophytes, sclerosis, bone on bone, patellofemoral joint space narrowing with osteophyte formation consistent with patellofemoral arthritis, Kellgren and Eduardo grade 4, varus deformity  \par \par Left knee xray merchant view, taken at the office today demonstrates good joint space and a well centered patella.

## 2021-06-25 NOTE — HISTORY OF PRESENT ILLNESS
[de-identified] : 61 year old male presents following up with right knee pain. he has undergone multiple treatments for osteoarthritis such as steroid injections in January 2021 and Euflexxa injections in February 2021. He is ready to discuss a right TKR. He is healthy overall with the exception of HTN and an extra beat in his heart. He is not on any blood thinners or have any blood clots.

## 2021-06-25 NOTE — ADDENDUM
[FreeTextEntry1] : This note was written by CARLOS GARCIA  on 06/25/2021  acting as scribe for Dr. Jarad Montoya M.D.\par \par I, Dr. Jarad Montoya, have read and attest that all the information, medical decision making and discharge instructions within are true and accurate.

## 2021-06-25 NOTE — DISCUSSION/SUMMARY
[de-identified] : Discussed at length the natural history of RIGHT knee degenerative arthritis and reviewed non-operative and operative treatment. Due to the pain, failure of prior nonoperative treatment including injections, NSAIDs, and physiotherapy, and associated disability I recommend a RIGHT total knee replacement. The risks, benefits, convalescence and alternatives were reviewed. Numerous questions were asked and answered. We did discuss implant choice and fixation, with shared decision making with the patient. Models were used as an educational tool. Surgery will be scheduled at a convenient time. Preop medical clearance.

## 2021-08-27 ENCOUNTER — OUTPATIENT (OUTPATIENT)
Dept: OUTPATIENT SERVICES | Facility: HOSPITAL | Age: 61
LOS: 1 days | Discharge: HOME | End: 2021-08-27
Payer: COMMERCIAL

## 2021-08-27 VITALS
DIASTOLIC BLOOD PRESSURE: 75 MMHG | WEIGHT: 199.52 LBS | OXYGEN SATURATION: 98 % | SYSTOLIC BLOOD PRESSURE: 145 MMHG | HEIGHT: 68 IN | TEMPERATURE: 98 F | HEART RATE: 76 BPM | RESPIRATION RATE: 16 BRPM

## 2021-08-27 DIAGNOSIS — Z01.818 ENCOUNTER FOR OTHER PREPROCEDURAL EXAMINATION: ICD-10-CM

## 2021-08-27 DIAGNOSIS — M17.11 UNILATERAL PRIMARY OSTEOARTHRITIS, RIGHT KNEE: ICD-10-CM

## 2021-08-27 DIAGNOSIS — Z98.890 OTHER SPECIFIED POSTPROCEDURAL STATES: Chronic | ICD-10-CM

## 2021-08-27 LAB
A1C WITH ESTIMATED AVERAGE GLUCOSE RESULT: 6.4 % — HIGH (ref 4–5.6)
ANION GAP SERPL CALC-SCNC: 5 MMOL/L — SIGNIFICANT CHANGE UP (ref 5–17)
APPEARANCE UR: CLEAR — SIGNIFICANT CHANGE UP
APTT BLD: 33.4 SEC — SIGNIFICANT CHANGE UP (ref 27.5–35.5)
BILIRUB UR-MCNC: NEGATIVE — SIGNIFICANT CHANGE UP
BLD GP AB SCN SERPL QL: SIGNIFICANT CHANGE UP
BUN SERPL-MCNC: 23 MG/DL — SIGNIFICANT CHANGE UP (ref 7–23)
CALCIUM SERPL-MCNC: 9.2 MG/DL — SIGNIFICANT CHANGE UP (ref 8.5–10.1)
CHLORIDE SERPL-SCNC: 103 MMOL/L — SIGNIFICANT CHANGE UP (ref 96–108)
CO2 SERPL-SCNC: 29 MMOL/L — SIGNIFICANT CHANGE UP (ref 22–31)
COLOR SPEC: YELLOW — SIGNIFICANT CHANGE UP
CREAT SERPL-MCNC: 0.95 MG/DL — SIGNIFICANT CHANGE UP (ref 0.5–1.3)
DIFF PNL FLD: NEGATIVE — SIGNIFICANT CHANGE UP
ESTIMATED AVERAGE GLUCOSE: 137 MG/DL — HIGH (ref 68–114)
GLUCOSE SERPL-MCNC: 142 MG/DL — HIGH (ref 70–99)
GLUCOSE UR QL: NEGATIVE MG/DL — SIGNIFICANT CHANGE UP
HCT VFR BLD CALC: 40.3 % — SIGNIFICANT CHANGE UP (ref 39–50)
HGB BLD-MCNC: 14.2 G/DL — SIGNIFICANT CHANGE UP (ref 13–17)
INR BLD: 1.03 RATIO — SIGNIFICANT CHANGE UP (ref 0.88–1.16)
KETONES UR-MCNC: NEGATIVE — SIGNIFICANT CHANGE UP
LEUKOCYTE ESTERASE UR-ACNC: NEGATIVE — SIGNIFICANT CHANGE UP
MCHC RBC-ENTMCNC: 32 PG — SIGNIFICANT CHANGE UP (ref 27–34)
MCHC RBC-ENTMCNC: 35.2 GM/DL — SIGNIFICANT CHANGE UP (ref 32–36)
MCV RBC AUTO: 90.8 FL — SIGNIFICANT CHANGE UP (ref 80–100)
MRSA PCR RESULT.: SIGNIFICANT CHANGE UP
NITRITE UR-MCNC: NEGATIVE — SIGNIFICANT CHANGE UP
NRBC # BLD: 0 /100 WBCS — SIGNIFICANT CHANGE UP (ref 0–0)
PH UR: 5 — SIGNIFICANT CHANGE UP (ref 5–8)
PLATELET # BLD AUTO: 206 K/UL — SIGNIFICANT CHANGE UP (ref 150–400)
POTASSIUM SERPL-MCNC: 4.2 MMOL/L — SIGNIFICANT CHANGE UP (ref 3.5–5.3)
POTASSIUM SERPL-SCNC: 4.2 MMOL/L — SIGNIFICANT CHANGE UP (ref 3.5–5.3)
PROT UR-MCNC: NEGATIVE MG/DL — SIGNIFICANT CHANGE UP
PROTHROM AB SERPL-ACNC: 11.9 SEC — SIGNIFICANT CHANGE UP (ref 10.6–13.6)
RBC # BLD: 4.44 M/UL — SIGNIFICANT CHANGE UP (ref 4.2–5.8)
RBC # FLD: 12 % — SIGNIFICANT CHANGE UP (ref 10.3–14.5)
S AUREUS DNA NOSE QL NAA+PROBE: SIGNIFICANT CHANGE UP
SODIUM SERPL-SCNC: 137 MMOL/L — SIGNIFICANT CHANGE UP (ref 135–145)
SP GR SPEC: 1.02 — SIGNIFICANT CHANGE UP (ref 1.01–1.02)
UROBILINOGEN FLD QL: NEGATIVE MG/DL — SIGNIFICANT CHANGE UP
WBC # BLD: 5.28 K/UL — SIGNIFICANT CHANGE UP (ref 3.8–10.5)
WBC # FLD AUTO: 5.28 K/UL — SIGNIFICANT CHANGE UP (ref 3.8–10.5)

## 2021-08-27 PROCEDURE — 93010 ELECTROCARDIOGRAM REPORT: CPT

## 2021-08-27 NOTE — H&P PST ADULT - ASSESSMENT
61 year old male with a past medical history of HTN, and HLD c/o pain, swelling,  and limited ROM to right knee secondary to osteoarthritis.  He is scheduled for a right total knee replacement on 2021.    CAPRINI SCORE [CLOT]    AGE RELATED RISK FACTORS                                                       MOBILITY RELATED FACTORS  [ ] Age 41-60 years                                            (1 Point)                  [ ] Bed rest                                                        (1 Point)  [x] Age: 61-74 years                                           (2 Points)                 [ ] Plaster cast                                                   (2 Points)  [ ] Age= 75 years                                              (3 Points)                 [ ] Bed bound for more than 72 hours                 (2 Points)    DISEASE RELATED RISK FACTORS                                               GENDER SPECIFIC FACTORS  [ x] Edema in the lower extremities                       (1 Point)                  [ ] Pregnancy                                                     (1 Point)  [ ] Varicose veins                                               (1 Point)                  [ ] Post-partum < 6 weeks                                   (1 Point)             [x ] BMI > 25 Kg/m2                                            (1 Point)                  [ ] Hormonal therapy  or oral contraception          (1 Point)                 [ ] Sepsis (in the previous month)                        (1 Point)                  [ ] History of pregnancy complications                 (1 point)  [ ] Pneumonia or serious lung disease                                               [ ] Unexplained or recurrent                     (1 Point)           (in the previous month)                               (1 Point)  [ ] Abnormal pulmonary function test                     (1 Point)                 SURGERY RELATED RISK FACTORS  [ ] Acute myocardial infarction                              (1 Point)                 [ ]  Section                                             (1 Point)  [ ] Congestive heart failure (in the previous month)  (1 Point)               [ ] Minor surgery                                                  (1 Point)   [ ] Inflammatory bowel disease                             (1 Point)                 [ ] Arthroscopic surgery                                        (2 Points)  [ ] Central venous access                                      (2 Points)                [ ] General surgery lasting more than 45 minutes   (2 Points)       [ ] Stroke (in the previous month)                          (5 Points)               [ ]x Elective arthroplasty                                         (5 Points)                                                                                                                                               HEMATOLOGY RELATED FACTORS                                                 TRAUMA RELATED RISK FACTORS  [ ] Prior episodes of VTE                                     (3 Points)                [ ] Fracture of the hip, pelvis, or leg                       (5 Points)  [ ] Positive family history for VTE                         (3 Points)                 [ ] Acute spinal cord injury (in the previous month)  (5 Points)  [ ] Prothrombin 11013 A                                     (3 Points)                 [ ] Paralysis  (less than 1 month)                             (5 Points)  [ ] Factor V Leiden                                             (3 Points)                  [ ] Multiple Trauma within 1 month                        (5 Points)  [ ] Lupus anticoagulants                                     (3 Points)                                                           [ ] Anticardiolipin antibodies                               (3 Points)                                                       [ ] High homocysteine in the blood                      (3 Points)                                             [ ] Other congenital or acquired thrombophilia      (3 Points)                                                [ ] Heparin induced thrombocytopenia                  (3 Points)                                          Total Score [     9    ]    Caprini Score 0 - 2:  Low Risk, No VTE Prophylaxis required for most patients, encourage ambulation  Caprini Score 3 - 6:  At Risk, pharmacologic VTE prophylaxis is indicated for most patients (in the absence of a contraindication)  Caprini Score Greater than or = 7:  High Risk, pharmacologic VTE prophylaxis is indicated for most patients (in the absence of a contraindication)    Caprini score indicates that the patient is high risk for VTE event ( score 6 or greater). Surgical patient's in this group will benefit from both pharmacologic prophylaxis and intermittent compression devices . Surgical team will determine the balance between VTE  risk and bleeding risk and other clinical considerations

## 2021-08-27 NOTE — OCCUPATIONAL THERAPY INITIAL EVALUATION ADULT - PERTINENT HX OF CURRENT PROBLEM, REHAB EVAL
Pt is a 60 y/o male slated for elective surgery for right TKR with MD Montoya on 9/21/21, due to OA, chronic pain and DJD. Pt denied any falls in the past 3-6 months

## 2021-08-27 NOTE — OCCUPATIONAL THERAPY INITIAL EVALUATION ADULT - LIVES WITH, PROFILE
and 6 year old daughter in a private house with no steps to enter from the garage. Once inside, pt has to negotiate 5 steps with right ascending handrail, to access the bedroom and bathroom. The bathroom has a walk in shower, fixed shower head and comfort height toilet with inadequate space to fit a commode over it.. Pt stated he can recuperate at the main level in the den. There is another bathroom at this level with the same aforementioned set up./spouse

## 2021-08-27 NOTE — H&P PST ADULT - PROBLEM SELECTOR PLAN 2
labs - cbc,pt/ptt,bmp,t&s,nose cx,ekg  M/C, cardiac clearance required  preop 3 day Hibiclens instruction reviewed and given .instructed on if  nose cx positive use Mupirocin 5 days and checklist given  take routine meds DOS with sips of water. avoid NSAID and OTC supplements. verbalized understanding  information on proper nutrition , increase protein and better food choices provided in packet

## 2021-08-27 NOTE — H&P PST ADULT - HISTORY OF PRESENT ILLNESS
61 year old male with a past medical history of HTN, and HLD c/o pain, swelling,  and limited ROM to right knee secondary to osteoarthritis.  He is scheduled for a right total knee replacement on 9/21/2021.    He denies fever, cough, SOB, recent travels, and sick contacts.    Goal: To walk long distance and golf.

## 2021-08-27 NOTE — OCCUPATIONAL THERAPY INITIAL EVALUATION ADULT - RANGE OF MOTION EXAMINATION, LOWER EXTREMITY
Left LE Active ROM was WNL  (within normal limits)/Left LE Passive ROM was WNL (within normal limits)/Right LE Passive ROM was WFL  (within functional limits)

## 2021-08-27 NOTE — OCCUPATIONAL THERAPY INITIAL EVALUATION ADULT - GENERAL OBSERVATIONS, REHAB EVAL
Chart reviewed. Patient encountered seated in chair in rehab preop room in [facility]. Patient underwent occupational therapy pre-operative consultation to determine current functional ADL limitations in order to provide the right equipment for patient to perform functional ADL post operation.

## 2021-08-27 NOTE — OCCUPATIONAL THERAPY INITIAL EVALUATION ADULT - ANTICIPATED DISCHARGE DISPOSITION, OT EVAL
Recommend home with rolling walker and OT referral to enable patient to safely perform ADL management and functional mobility.  Pt will purchase a raised toilet seat since he does not have adequate space in his bathroom to fit a 3:1 commode.

## 2021-08-27 NOTE — PHYSICAL THERAPY INITIAL EVALUATION ADULT - SINGLE LEG BALANCE TEST, REHAB EVAL
One Leg Stand Test (OLST): Right: 5 seconds Left: 5 seconds.  Functional test to assess fall risk. Both gait and stair negotiation require components of OLST. Participants unable to perform the OLST for at least 5 seconds are at increased risk for injurious fall.

## 2021-08-27 NOTE — OCCUPATIONAL THERAPY INITIAL EVALUATION ADULT - ADDITIONAL COMMENTS
Presently , pt is functioning in his roles, self sufficient, driving & ambulating independently in the community without any assistive devices. Pt owns no DME .Pt is right hand dominant and wears glasses for reading. Pt c/o 7/10 pain in his right knee at rest and 10/10 at worse. The pain is exacerbated, by walking, prolonged standing, negotiating steps and is relieved with Advil. Pt scores 90% of patient specific scale.

## 2021-08-28 LAB
CULTURE RESULTS: SIGNIFICANT CHANGE UP
SPECIMEN SOURCE: SIGNIFICANT CHANGE UP

## 2021-09-09 PROBLEM — I10 ESSENTIAL (PRIMARY) HYPERTENSION: Chronic | Status: ACTIVE | Noted: 2021-08-27

## 2021-09-09 PROBLEM — E78.5 HYPERLIPIDEMIA, UNSPECIFIED: Chronic | Status: ACTIVE | Noted: 2021-08-27

## 2021-09-17 DIAGNOSIS — Z01.818 ENCOUNTER FOR OTHER PREPROCEDURAL EXAMINATION: ICD-10-CM

## 2021-09-18 ENCOUNTER — APPOINTMENT (OUTPATIENT)
Dept: DISASTER EMERGENCY | Facility: CLINIC | Age: 61
End: 2021-09-18

## 2021-09-19 LAB — SARS-COV-2 N GENE NPH QL NAA+PROBE: NOT DETECTED

## 2021-09-20 RX ORDER — ONDANSETRON 8 MG/1
4 TABLET, FILM COATED ORAL EVERY 6 HOURS
Refills: 0 | Status: DISCONTINUED | OUTPATIENT
Start: 2021-09-21 | End: 2021-09-22

## 2021-09-20 RX ORDER — LOSARTAN POTASSIUM 100 MG/1
100 TABLET, FILM COATED ORAL DAILY
Refills: 0 | Status: DISCONTINUED | OUTPATIENT
Start: 2021-09-21 | End: 2021-09-22

## 2021-09-20 RX ORDER — TRAMADOL HYDROCHLORIDE 50 MG/1
50 TABLET ORAL EVERY 6 HOURS
Refills: 0 | Status: DISCONTINUED | OUTPATIENT
Start: 2021-09-21 | End: 2021-09-22

## 2021-09-20 RX ORDER — FOLIC ACID 0.8 MG
1 TABLET ORAL DAILY
Refills: 0 | Status: DISCONTINUED | OUTPATIENT
Start: 2021-09-21 | End: 2021-09-22

## 2021-09-20 RX ORDER — POLYETHYLENE GLYCOL 3350 17 G/17G
17 POWDER, FOR SOLUTION ORAL AT BEDTIME
Refills: 0 | Status: DISCONTINUED | OUTPATIENT
Start: 2021-09-21 | End: 2021-09-22

## 2021-09-20 RX ORDER — SODIUM CHLORIDE 9 MG/ML
1000 INJECTION, SOLUTION INTRAVENOUS
Refills: 0 | Status: DISCONTINUED | OUTPATIENT
Start: 2021-09-21 | End: 2021-09-22

## 2021-09-20 RX ORDER — HYDROMORPHONE HYDROCHLORIDE 2 MG/ML
0.5 INJECTION INTRAMUSCULAR; INTRAVENOUS; SUBCUTANEOUS ONCE
Refills: 0 | Status: COMPLETED | OUTPATIENT
Start: 2021-09-21 | End: 2021-09-28

## 2021-09-20 RX ORDER — FAMOTIDINE 10 MG/ML
20 INJECTION INTRAVENOUS EVERY 12 HOURS
Refills: 0 | Status: DISCONTINUED | OUTPATIENT
Start: 2021-09-21 | End: 2021-09-22

## 2021-09-20 RX ORDER — MAGNESIUM HYDROXIDE 400 MG/1
30 TABLET, CHEWABLE ORAL DAILY
Refills: 0 | Status: DISCONTINUED | OUTPATIENT
Start: 2021-09-21 | End: 2021-09-22

## 2021-09-20 RX ORDER — SENNA PLUS 8.6 MG/1
2 TABLET ORAL AT BEDTIME
Refills: 0 | Status: DISCONTINUED | OUTPATIENT
Start: 2021-09-21 | End: 2021-09-22

## 2021-09-20 RX ORDER — CEFAZOLIN SODIUM 1 G
2000 VIAL (EA) INJECTION EVERY 8 HOURS
Refills: 0 | Status: COMPLETED | OUTPATIENT
Start: 2021-09-21 | End: 2021-09-22

## 2021-09-20 RX ORDER — ENOXAPARIN SODIUM 100 MG/ML
40 INJECTION SUBCUTANEOUS EVERY 24 HOURS
Refills: 0 | Status: DISCONTINUED | OUTPATIENT
Start: 2021-09-22 | End: 2021-09-22

## 2021-09-20 RX ORDER — ATORVASTATIN CALCIUM 80 MG/1
80 TABLET, FILM COATED ORAL AT BEDTIME
Refills: 0 | Status: DISCONTINUED | OUTPATIENT
Start: 2021-09-21 | End: 2021-09-22

## 2021-09-20 RX ORDER — OXYCODONE HYDROCHLORIDE 5 MG/1
10 TABLET ORAL
Refills: 0 | Status: DISCONTINUED | OUTPATIENT
Start: 2021-09-21 | End: 2021-09-22

## 2021-09-20 RX ORDER — OXYCODONE HYDROCHLORIDE 5 MG/1
5 TABLET ORAL
Refills: 0 | Status: DISCONTINUED | OUTPATIENT
Start: 2021-09-21 | End: 2021-09-22

## 2021-09-20 RX ORDER — CELECOXIB 200 MG/1
200 CAPSULE ORAL EVERY 12 HOURS
Refills: 0 | Status: DISCONTINUED | OUTPATIENT
Start: 2021-09-22 | End: 2021-09-22

## 2021-09-20 RX ORDER — ACETAMINOPHEN 500 MG
975 TABLET ORAL EVERY 8 HOURS
Refills: 0 | Status: DISCONTINUED | OUTPATIENT
Start: 2021-09-21 | End: 2021-09-22

## 2021-09-21 ENCOUNTER — RESULT REVIEW (OUTPATIENT)
Age: 61
End: 2021-09-21

## 2021-09-21 ENCOUNTER — INPATIENT (INPATIENT)
Facility: HOSPITAL | Age: 61
LOS: 0 days | Discharge: HOME HEALTH | End: 2021-09-22
Attending: ORTHOPAEDIC SURGERY | Admitting: ORTHOPAEDIC SURGERY
Payer: COMMERCIAL

## 2021-09-21 ENCOUNTER — TRANSCRIPTION ENCOUNTER (OUTPATIENT)
Age: 61
End: 2021-09-21

## 2021-09-21 ENCOUNTER — APPOINTMENT (OUTPATIENT)
Dept: ORTHOPEDIC SURGERY | Facility: HOSPITAL | Age: 61
End: 2021-09-21

## 2021-09-21 VITALS
SYSTOLIC BLOOD PRESSURE: 118 MMHG | HEIGHT: 68 IN | HEART RATE: 73 BPM | DIASTOLIC BLOOD PRESSURE: 73 MMHG | WEIGHT: 197.09 LBS | OXYGEN SATURATION: 100 % | RESPIRATION RATE: 17 BRPM | TEMPERATURE: 98 F

## 2021-09-21 DIAGNOSIS — Z98.890 OTHER SPECIFIED POSTPROCEDURAL STATES: Chronic | ICD-10-CM

## 2021-09-21 LAB
ANION GAP SERPL CALC-SCNC: 5 MMOL/L — SIGNIFICANT CHANGE UP (ref 5–17)
APTT BLD: 30.9 SEC — SIGNIFICANT CHANGE UP (ref 27.5–35.5)
BUN SERPL-MCNC: 22 MG/DL — SIGNIFICANT CHANGE UP (ref 7–23)
CALCIUM SERPL-MCNC: 8.6 MG/DL — SIGNIFICANT CHANGE UP (ref 8.5–10.1)
CHLORIDE SERPL-SCNC: 103 MMOL/L — SIGNIFICANT CHANGE UP (ref 96–108)
CO2 SERPL-SCNC: 28 MMOL/L — SIGNIFICANT CHANGE UP (ref 22–31)
CREAT SERPL-MCNC: 1.07 MG/DL — SIGNIFICANT CHANGE UP (ref 0.5–1.3)
GLUCOSE SERPL-MCNC: 175 MG/DL — HIGH (ref 70–99)
HCT VFR BLD CALC: 35.9 % — LOW (ref 39–50)
HGB BLD-MCNC: 12.7 G/DL — LOW (ref 13–17)
INR BLD: 1.06 RATIO — SIGNIFICANT CHANGE UP (ref 0.88–1.16)
MCHC RBC-ENTMCNC: 31.7 PG — SIGNIFICANT CHANGE UP (ref 27–34)
MCHC RBC-ENTMCNC: 35.4 GM/DL — SIGNIFICANT CHANGE UP (ref 32–36)
MCV RBC AUTO: 89.5 FL — SIGNIFICANT CHANGE UP (ref 80–100)
NRBC # BLD: 0 /100 WBCS — SIGNIFICANT CHANGE UP (ref 0–0)
PLATELET # BLD AUTO: 154 K/UL — SIGNIFICANT CHANGE UP (ref 150–400)
POTASSIUM SERPL-MCNC: 4.1 MMOL/L — SIGNIFICANT CHANGE UP (ref 3.5–5.3)
POTASSIUM SERPL-SCNC: 4.1 MMOL/L — SIGNIFICANT CHANGE UP (ref 3.5–5.3)
PROTHROM AB SERPL-ACNC: 12.3 SEC — SIGNIFICANT CHANGE UP (ref 10.6–13.6)
RBC # BLD: 4.01 M/UL — LOW (ref 4.2–5.8)
RBC # FLD: 12 % — SIGNIFICANT CHANGE UP (ref 10.3–14.5)
SODIUM SERPL-SCNC: 136 MMOL/L — SIGNIFICANT CHANGE UP (ref 135–145)
WBC # BLD: 5.87 K/UL — SIGNIFICANT CHANGE UP (ref 3.8–10.5)
WBC # FLD AUTO: 5.87 K/UL — SIGNIFICANT CHANGE UP (ref 3.8–10.5)

## 2021-09-21 PROCEDURE — 73560 X-RAY EXAM OF KNEE 1 OR 2: CPT | Mod: 26,RT

## 2021-09-21 PROCEDURE — 27447 TOTAL KNEE ARTHROPLASTY: CPT | Mod: RT

## 2021-09-21 PROCEDURE — 88305 TISSUE EXAM BY PATHOLOGIST: CPT | Mod: 26

## 2021-09-21 PROCEDURE — 88311 DECALCIFY TISSUE: CPT | Mod: 26

## 2021-09-21 RX ORDER — OXYCODONE HYDROCHLORIDE 5 MG/1
1 TABLET ORAL
Qty: 30 | Refills: 0
Start: 2021-09-21 | End: 2021-09-25

## 2021-09-21 RX ORDER — ONDANSETRON 8 MG/1
1 TABLET, FILM COATED ORAL
Qty: 21 | Refills: 0
Start: 2021-09-21 | End: 2021-09-27

## 2021-09-21 RX ORDER — METOCLOPRAMIDE HCL 10 MG
10 TABLET ORAL ONCE
Refills: 0 | Status: DISCONTINUED | OUTPATIENT
Start: 2021-09-21 | End: 2021-09-21

## 2021-09-21 RX ORDER — HYDROMORPHONE HYDROCHLORIDE 2 MG/ML
0.5 INJECTION INTRAMUSCULAR; INTRAVENOUS; SUBCUTANEOUS ONCE
Refills: 0 | Status: DISCONTINUED | OUTPATIENT
Start: 2021-09-21 | End: 2021-09-22

## 2021-09-21 RX ORDER — CELECOXIB 200 MG/1
200 CAPSULE ORAL ONCE
Refills: 0 | Status: COMPLETED | OUTPATIENT
Start: 2021-09-21 | End: 2021-09-21

## 2021-09-21 RX ORDER — ACETAMINOPHEN 500 MG
650 TABLET ORAL ONCE
Refills: 0 | Status: COMPLETED | OUTPATIENT
Start: 2021-09-21 | End: 2021-09-21

## 2021-09-21 RX ORDER — INFLUENZA VIRUS VACCINE 15; 15; 15; 15 UG/.5ML; UG/.5ML; UG/.5ML; UG/.5ML
0.5 SUSPENSION INTRAMUSCULAR ONCE
Refills: 0 | Status: DISCONTINUED | OUTPATIENT
Start: 2021-09-21 | End: 2021-09-22

## 2021-09-21 RX ORDER — SODIUM CHLORIDE 9 MG/ML
3 INJECTION INTRAMUSCULAR; INTRAVENOUS; SUBCUTANEOUS EVERY 8 HOURS
Refills: 0 | Status: DISCONTINUED | OUTPATIENT
Start: 2021-09-21 | End: 2021-09-21

## 2021-09-21 RX ORDER — FENTANYL CITRATE 50 UG/ML
50 INJECTION INTRAVENOUS
Refills: 0 | Status: DISCONTINUED | OUTPATIENT
Start: 2021-09-21 | End: 2021-09-21

## 2021-09-21 RX ORDER — TELMISARTAN AND HYDROCHLOROTHIAZIDE 40; 12.5 MG/1; MG/1
1 TABLET ORAL
Qty: 0 | Refills: 0 | DISCHARGE

## 2021-09-21 RX ORDER — ACETAMINOPHEN 500 MG
650 TABLET ORAL ONCE
Refills: 0 | Status: DISCONTINUED | OUTPATIENT
Start: 2021-09-21 | End: 2021-09-21

## 2021-09-21 RX ORDER — ASPIRIN/CALCIUM CARB/MAGNESIUM 324 MG
1 TABLET ORAL
Qty: 60 | Refills: 0
Start: 2021-09-21 | End: 2021-10-20

## 2021-09-21 RX ORDER — DOCUSATE SODIUM 100 MG
1 CAPSULE ORAL
Qty: 21 | Refills: 0
Start: 2021-09-21 | End: 2021-09-27

## 2021-09-21 RX ORDER — ACETAMINOPHEN 500 MG
1000 TABLET ORAL ONCE
Refills: 0 | Status: COMPLETED | OUTPATIENT
Start: 2021-09-21 | End: 2021-09-21

## 2021-09-21 RX ORDER — NEBIVOLOL HYDROCHLORIDE 5 MG/1
1 TABLET ORAL
Qty: 0 | Refills: 0 | DISCHARGE

## 2021-09-21 RX ORDER — SODIUM CHLORIDE 9 MG/ML
1000 INJECTION, SOLUTION INTRAVENOUS
Refills: 0 | Status: DISCONTINUED | OUTPATIENT
Start: 2021-09-21 | End: 2021-09-21

## 2021-09-21 RX ORDER — FENTANYL CITRATE 50 UG/ML
25 INJECTION INTRAVENOUS
Refills: 0 | Status: DISCONTINUED | OUTPATIENT
Start: 2021-09-21 | End: 2021-09-21

## 2021-09-21 RX ORDER — ENOXAPARIN SODIUM 100 MG/ML
40 INJECTION SUBCUTANEOUS
Qty: 5.6 | Refills: 0
Start: 2021-09-21 | End: 2021-10-04

## 2021-09-21 RX ORDER — ROSUVASTATIN CALCIUM 5 MG/1
1 TABLET ORAL
Qty: 0 | Refills: 0 | DISCHARGE

## 2021-09-21 RX ADMIN — SODIUM CHLORIDE 125 MILLILITER(S): 9 INJECTION, SOLUTION INTRAVENOUS at 10:43

## 2021-09-21 RX ADMIN — Medication 1000 MILLIGRAM(S): at 15:30

## 2021-09-21 RX ADMIN — OXYCODONE HYDROCHLORIDE 5 MILLIGRAM(S): 5 TABLET ORAL at 16:29

## 2021-09-21 RX ADMIN — SODIUM CHLORIDE 75 MILLILITER(S): 9 INJECTION, SOLUTION INTRAVENOUS at 11:36

## 2021-09-21 RX ADMIN — SENNA PLUS 2 TABLET(S): 8.6 TABLET ORAL at 21:09

## 2021-09-21 RX ADMIN — OXYCODONE HYDROCHLORIDE 10 MILLIGRAM(S): 5 TABLET ORAL at 21:09

## 2021-09-21 RX ADMIN — ATORVASTATIN CALCIUM 80 MILLIGRAM(S): 80 TABLET, FILM COATED ORAL at 21:09

## 2021-09-21 RX ADMIN — Medication 975 MILLIGRAM(S): at 22:05

## 2021-09-21 RX ADMIN — OXYCODONE HYDROCHLORIDE 10 MILLIGRAM(S): 5 TABLET ORAL at 22:05

## 2021-09-21 RX ADMIN — Medication 400 MILLIGRAM(S): at 14:47

## 2021-09-21 RX ADMIN — Medication 1 MILLIGRAM(S): at 11:36

## 2021-09-21 RX ADMIN — Medication 650 MILLIGRAM(S): at 07:16

## 2021-09-21 RX ADMIN — FAMOTIDINE 20 MILLIGRAM(S): 10 INJECTION INTRAVENOUS at 17:14

## 2021-09-21 RX ADMIN — OXYCODONE HYDROCHLORIDE 5 MILLIGRAM(S): 5 TABLET ORAL at 17:29

## 2021-09-21 RX ADMIN — Medication 1 TABLET(S): at 11:36

## 2021-09-21 RX ADMIN — Medication 100 MILLIGRAM(S): at 16:26

## 2021-09-21 RX ADMIN — POLYETHYLENE GLYCOL 3350 17 GRAM(S): 17 POWDER, FOR SOLUTION ORAL at 21:09

## 2021-09-21 RX ADMIN — Medication 975 MILLIGRAM(S): at 21:09

## 2021-09-21 RX ADMIN — CELECOXIB 200 MILLIGRAM(S): 200 CAPSULE ORAL at 07:15

## 2021-09-21 RX ADMIN — SODIUM CHLORIDE 75 MILLILITER(S): 9 INJECTION, SOLUTION INTRAVENOUS at 21:08

## 2021-09-21 NOTE — OCCUPATIONAL THERAPY INITIAL EVALUATION ADULT - ADDITIONAL COMMENTS
Pt lives in a private home c spouse (able to assist post-operatively) and young daughter c no stairs to enter through garage. Once inside, pt must negotiate 5 stairs c R handrail to reach main bedroom/bathroom. Pt's bathroom is equipped c a walk-in shower, fixed shower head, and comfort height toilet seat. Pt is right hand dominant, wears glasses for reading and currently drives.

## 2021-09-21 NOTE — DISCHARGE NOTE PROVIDER - NSDCMRMEDTOKEN_GEN_ALL_CORE_FT
Bystolic 5 mg oral tablet: 1 tab(s) orally once a day  rosuvastatin 20 mg oral tablet: 1 tab(s) orally once a day  telmisartan-hydrochlorothiazide 80mg-25mg oral tablet: 1 tab(s) orally once a day   Aspirin Enteric Coated 81 mg oral delayed release tablet: 1 tab(s) orally 2 times a day MDD:two  Bystolic 5 mg oral tablet: 1 tab(s) orally once a day  Colace 100 mg oral capsule: 1 cap(s) orally 3 times a day   enoxaparin 40 mg/0.4 mL injectable solution: 40 milligram(s) subcutaneously once a day MDD:1  oxyCODONE 5 mg oral tablet: 1 tab(s) orally every 4 hours, As Needed MDD:6  rosuvastatin 20 mg oral tablet: 1 tab(s) orally once a day  telmisartan-hydrochlorothiazide 80mg-25mg oral tablet: 1 tab(s) orally once a day  Zofran 4 mg oral tablet: 1 tab(s) orally 3 times a day

## 2021-09-21 NOTE — DISCHARGE NOTE PROVIDER - CARE PROVIDER_API CALL
Jarad Montoya)  Orthopaedic Surgery  [address], [address] [place] [address]  Phone: [phone]  Fax: [phone]  Follow Up Time:

## 2021-09-21 NOTE — PHYSICAL THERAPY INITIAL EVALUATION ADULT - ADDITIONAL COMMENTS
Pre-op info confirmed. Pt lives in a private home with no steps to enter. Once inside there are 5 + 5 steps with 1 handrail to R to 2nd floor (split level). Bathroom is a walk in shower with no grab bars, both fixed and retractable shower head, comfort height seat. Pt reporting that a 3:1 commode will not fit, will get raised toilet seat with arms privately. Pt has a cane, no other dme. Pain is 7/10 at rest and can increase to 10/10 with activity. Pt takes advil prn, reports no adverse reactions to pain medications, goes to PT 2x week, no falls, no buckling. Pt wears reading glasses, is R hand dominant, drives, no hearing impairments, still working.

## 2021-09-21 NOTE — OCCUPATIONAL THERAPY INITIAL EVALUATION ADULT - TRANSFER TRAINING, PT EVAL
Patient will be able to perform functional transfers, using least restrictive device, independently within 1-2 weeks.

## 2021-09-21 NOTE — DISCHARGE NOTE PROVIDER - HOSPITAL COURSE
The patient is a 61y Male status post elective R total knee Arthroplasty after failing outpatient nonoperative conservative management. Patient presented to [facility] after being medically cleared for an elective surgical procedure. The patient was taken to the operating room on date mentioned above. Prophylactic antibiotics were started before the procedure and continued for 24 hours. There were no complications during the procedure and patient tolerated the procedure well. The patient was transferred to the recovery room in stable condition and subsequently to the surgical floor. The patient was placed on Lovenox for anticoagulation while in house. All home medications were continued. The patient received physical therapy daily and daily labs were followed. The dressing was kept clean, dry, intact. The rest of the hospital stay was unremarkable.

## 2021-09-21 NOTE — PHYSICAL THERAPY INITIAL EVALUATION ADULT - ACTIVE RANGE OF MOTION EXAMINATION, REHAB EVAL
except R knee flexion 80 degrees, extension -5 degrees/bilateral upper extremity Active ROM was WFL (within functional limits)/bilateral  lower extremity Active ROM was WFL (within functional limits)

## 2021-09-21 NOTE — OCCUPATIONAL THERAPY INITIAL EVALUATION ADULT - STRENGTHENING, PT EVAL
Pt will increase right lower extremity strength by 1/2 grade to improve functional strength needed to engage in functional tasks by 1-2 weeks

## 2021-09-21 NOTE — PHYSICAL THERAPY INITIAL EVALUATION ADULT - STRENGTHENING, PT EVAL
Patient will improve R knee strength by 1 grade in 6-8 weeks to improve overall functional mobility including gait, transfers, bed mobility and decrease risk of falls.

## 2021-09-21 NOTE — PROGRESS NOTE ADULT - SUBJECTIVE AND OBJECTIVE BOX
Orthopedics Post-op Check  POD 0  Patient seen and examined at bedside. Pain is controlled. Pt feeling well. No nausea or vomiting.    Vital Signs Last 24 Hrs  T(C): 36.6 (09-21-21 @ 13:02), Max: 36.9 (09-21-21 @ 12:02)  T(F): 97.8 (09-21-21 @ 13:02), Max: 98.4 (09-21-21 @ 12:02)  HR: 81 (09-21-21 @ 13:02) (70 - 81)  BP: 136/64 (09-21-21 @ 13:02) (104/56 - 136/64)  RR: 16 (09-21-21 @ 13:02) (14 - 22)  SpO2: 98% (09-21-21 @ 13:02) (97% - 100%)                        12.7   5.87  )-----------( 154      ( 21 Sep 2021 10:13 )             35.9     21 Sep 2021 10:13    136    |  103    |  22     ----------------------------<  175    4.1     |  28     |  1.07     Ca    8.6        21 Sep 2021 10:13      PT/INR - ( 21 Sep 2021 10:13 )   PT: 12.3 sec;   INR: 1.06 ratio         PTT - ( 21 Sep 2021 10:13 )  PTT:30.9 sec    Exam:  Gen: NAD, resting comfortably  RLE  Dressing c/d/i, HMV in place  +EHL/FHL/TA/GS  SILT L2-S1  +DP  Calf NTTP b/l  Compartments soft and compressible    A/P:  61yMale Stable POD 0  s/p R TKA    -FU labs  -WBAT  -Pain control  -PT/OT  -Ppx ABX [id number]  -DVT PE ppx- hold until POD1  -Incentive spirometry  -Dispo: pending PT recs

## 2021-09-21 NOTE — OCCUPATIONAL THERAPY INITIAL EVALUATION ADULT - BALANCE DISTURBANCE, IDENTIFIED IMPAIRMENT CONTRIBUTE, REHAB EVAL
Normal rate, regular rhythm.  Heart sounds S1, S2.  No murmurs, rubs or gallops.
pain/decreased ROM/decreased strength

## 2021-09-21 NOTE — DISCHARGE NOTE PROVIDER - NSDCFUADDINST_GEN_ALL_CORE_FT
1.	Pain Control has been sent to your pharmacy as well as medications for constipation and nausea. take as needed  2.	Walking with full weight bearing as tolerated, with assistive devices (walker/Cane as Needed)  3.	DVT Prophylaxis for two weeks with Lovenox followed by 30 days of [id number] BID  4.	PT as needed  5.	Follow up with Dr. Montoya as Outpatient in 10-14 Days after Discharge from the Hospital or Rehab. Call Office For Appointment.  6.	Remove Staples Post-Op Day 14, and Remove Dressing Post-Op Day 10, with Daily Dressing Changes as Need.  7.	Ice affected area as Needed  8.	Keep Dressing  Clean and dry.

## 2021-09-21 NOTE — OCCUPATIONAL THERAPY INITIAL EVALUATION ADULT - BED MOBILITY TRAINING, PT EVAL
Patient will be able to perform bed mobility tasks independently, using least restrictive device, within 1-2 sessions.

## 2021-09-21 NOTE — OCCUPATIONAL THERAPY INITIAL EVALUATION ADULT - ANTICIPATED DISCHARGE DISPOSITION, OT EVAL
Home with home OT for home safety evaluation and to improve functional ADLs and transfers/mobility. Pt has rolling walker and raised toilet seat with arms.

## 2021-09-21 NOTE — OCCUPATIONAL THERAPY INITIAL EVALUATION ADULT - RANGE OF MOTION EXAMINATION, LOWER EXTREMITY
[FreeTextEntry1] : Here today for neck pain. \par Pain persists- gets spasms into the traps and rhomboids- worse on the left.\par Gets numbness and tinglng into the hands.  \par Can be severe at times.\par Has not had any injections.  Right LE knee AROM decreased grossly/Left LE Active ROM was WFL (within functional limits)

## 2021-09-21 NOTE — OCCUPATIONAL THERAPY INITIAL EVALUATION ADULT - GENERAL OBSERVATIONS, REHAB EVAL
Pt encountered ambulating c PT Ken, NAD, +heplock, +ACE R knee c/d/i, +hemovac intact, pt c/o pain s/p right TKA.

## 2021-09-21 NOTE — PHYSICAL THERAPY INITIAL EVALUATION ADULT - GENERAL OBSERVATIONS, REHAB EVAL
Pt found semi supine in bed in NAD, +hep lock, +SCDs, +Hemovac / ace wrap intact, agreeable to PT Divya and YE Hsu aware.

## 2021-09-21 NOTE — OCCUPATIONAL THERAPY INITIAL EVALUATION ADULT - BALANCE TRAINING, PT EVAL
Patient will be able to increase static and dynamic sitting/standing by 1/2 grade in order to participate in self care tasks and functional mobility/transfers within 1-2 weeks

## 2021-09-22 ENCOUNTER — TRANSCRIPTION ENCOUNTER (OUTPATIENT)
Age: 61
End: 2021-09-22

## 2021-09-22 VITALS — TEMPERATURE: 98 F

## 2021-09-22 LAB
ANION GAP SERPL CALC-SCNC: 6 MMOL/L — SIGNIFICANT CHANGE UP (ref 5–17)
BUN SERPL-MCNC: 18 MG/DL — SIGNIFICANT CHANGE UP (ref 7–23)
CALCIUM SERPL-MCNC: 9.2 MG/DL — SIGNIFICANT CHANGE UP (ref 8.5–10.1)
CHLORIDE SERPL-SCNC: 101 MMOL/L — SIGNIFICANT CHANGE UP (ref 96–108)
CO2 SERPL-SCNC: 29 MMOL/L — SIGNIFICANT CHANGE UP (ref 22–31)
CREAT SERPL-MCNC: 0.89 MG/DL — SIGNIFICANT CHANGE UP (ref 0.5–1.3)
GLUCOSE SERPL-MCNC: 184 MG/DL — HIGH (ref 70–99)
HCT VFR BLD CALC: 32.7 % — LOW (ref 39–50)
HGB BLD-MCNC: 11.6 G/DL — LOW (ref 13–17)
MCHC RBC-ENTMCNC: 32 PG — SIGNIFICANT CHANGE UP (ref 27–34)
MCHC RBC-ENTMCNC: 35.5 GM/DL — SIGNIFICANT CHANGE UP (ref 32–36)
MCV RBC AUTO: 90.3 FL — SIGNIFICANT CHANGE UP (ref 80–100)
NRBC # BLD: 0 /100 WBCS — SIGNIFICANT CHANGE UP (ref 0–0)
PLATELET # BLD AUTO: 167 K/UL — SIGNIFICANT CHANGE UP (ref 150–400)
POTASSIUM SERPL-MCNC: 4 MMOL/L — SIGNIFICANT CHANGE UP (ref 3.5–5.3)
POTASSIUM SERPL-SCNC: 4 MMOL/L — SIGNIFICANT CHANGE UP (ref 3.5–5.3)
RBC # BLD: 3.62 M/UL — LOW (ref 4.2–5.8)
RBC # FLD: 12 % — SIGNIFICANT CHANGE UP (ref 10.3–14.5)
SODIUM SERPL-SCNC: 136 MMOL/L — SIGNIFICANT CHANGE UP (ref 135–145)
WBC # BLD: 10.19 K/UL — SIGNIFICANT CHANGE UP (ref 3.8–10.5)
WBC # FLD AUTO: 10.19 K/UL — SIGNIFICANT CHANGE UP (ref 3.8–10.5)

## 2021-09-22 RX ADMIN — Medication 975 MILLIGRAM(S): at 06:25

## 2021-09-22 RX ADMIN — LOSARTAN POTASSIUM 100 MILLIGRAM(S): 100 TABLET, FILM COATED ORAL at 05:25

## 2021-09-22 RX ADMIN — CELECOXIB 200 MILLIGRAM(S): 200 CAPSULE ORAL at 06:25

## 2021-09-22 RX ADMIN — Medication 1 TABLET(S): at 11:28

## 2021-09-22 RX ADMIN — ENOXAPARIN SODIUM 40 MILLIGRAM(S): 100 INJECTION SUBCUTANEOUS at 07:59

## 2021-09-22 RX ADMIN — Medication 1 MILLIGRAM(S): at 11:29

## 2021-09-22 RX ADMIN — CELECOXIB 200 MILLIGRAM(S): 200 CAPSULE ORAL at 05:25

## 2021-09-22 RX ADMIN — Medication 100 MILLIGRAM(S): at 00:28

## 2021-09-22 RX ADMIN — Medication 975 MILLIGRAM(S): at 05:25

## 2021-09-22 RX ADMIN — FAMOTIDINE 20 MILLIGRAM(S): 10 INJECTION INTRAVENOUS at 05:25

## 2021-09-22 NOTE — PROGRESS NOTE ADULT - SUBJECTIVE AND OBJECTIVE BOX
Patient seen and examined at bedside, resting comfortably. No overnight events. Pain well controlled. Denies fevers, chills, chest pain, dyspnea, abdominal pain, n/v, numbness/tingling. No complaints at this time.     VITAL SIGNS  T(C): 36.5 (09-22-21 @ 02:30), Max: 36.9 (09-21-21 @ 12:02)  HR: 66 (09-22-21 @ 02:30) (66 - 81)  BP: 105/67 (09-22-21 @ 02:30) (104/56 - 139/76)  RR: 16 (09-22-21 @ 02:30) (14 - 22)  SpO2: 98% (09-22-21 @ 02:30) (96% - 100%)    LABS:                        12.7   5.87  )-----------( 154      ( 21 Sep 2021 10:13 )             35.9     09-21    136  |  103  |  22  ----------------------------<  175<H>  4.1   |  28  |  1.07    Ca    8.6      21 Sep 2021 10:13      PT/INR - ( 21 Sep 2021 10:13 )   PT: 12.3 sec;   INR: 1.06 ratio         PTT - ( 21 Sep 2021 10:13 )  PTT:30.9 sec        PHYSICAL EXAM  GEN: NAD    RLE:  Skin: Dressing clean/dry/intact  Compartments soft and compressible  HMV removed  Calves nontender  +TA/EHL/FHL/GSC  L2-S1 SILT  + DP pulses      Assessment:  61y Male s/p TKA POD #1    -Pain control  -DVT ppx  -WBAT/OOB with assistance as needed  -PT/OT  -Ice and elevate  -Encourage incentive spirometry  -DC planning, home  -Will discuss with attending and will advise if plan changes.

## 2021-09-22 NOTE — CONSULT NOTE ADULT - SUBJECTIVE AND OBJECTIVE BOX
Chief Complaint:  Patient is a 61y old  Male who presents with a chief complaint of Elective R TKA (21 Sep 2021 11:22)      HPI: Currently no sob or chest pain or palpitation . No current cough or fever . No current nausea or vomiting . Voiding . Pain controlled .      Review of Systems:    General:  No wt loss, fevers, chills, night sweats  Eyes:  Good vision, no reported pain  ENT:  No sore throat, pain, runny nose, dysphagia  CV:  No pain, palpitations, hypo/hypertension  Resp:  No dyspnea, cough, tachypnea, wheezing  GI:  No pain, nausea, vomiting, diarrhea, constipation  :  No pain, bleeding, incontinence, nocturia  Muscle:  No pain, weakness  Breast:  No pain, abscess, mass, discharge  Neuro:  No weakness, tingling, memory problems  Psych:  No fatigue, insomnia, mood problems, depression  Endocrine:  No polyuria, polydypsia, cold/heat intolerance  Heme:  No petechiae, ecchymosis, easy bruisability  Skin:  No rash, tattoos, scars, edema    Relevant Family History: NC . Allergy : NKDA .      Relevant Social History: NC .     PMH : HTN . Hyperlipidemia . Meds : Reviewed .    Physical Exam:      Vital Signs:  Vital Signs Last 24 Hrs  T(C): 36.6 (22 Sep 2021 06:30), Max: 36.9 (21 Sep 2021 12:02)  T(F): 97.8 (22 Sep 2021 06:30), Max: 98.4 (21 Sep 2021 12:02)  HR: 66 (22 Sep 2021 06:30) (66 - 81)  BP: 111/71 (22 Sep 2021 06:30) (104/56 - 139/76)  BP(mean): --  RR: 16 (22 Sep 2021 06:30) (14 - 22)  SpO2: 98% (22 Sep 2021 06:30) (96% - 100%)  Daily     Daily   I&O's Summary    21 Sep 2021 07:01  -  22 Sep 2021 07:00  --------------------------------------------------------  IN: 2500 mL / OUT: 100 mL / NET: 2400 mL        General:  Appears stated age, well-groomed, well-nourished, no distress  HEENT:  NC/AT, patent nares w/ pink mucosa, OP clear w/o lesions, PERRL, EOMI, conjunctivae clear, no thyromegaly, nodules, adenopathy, no JVD  Chest:  Full & symmetric excursion, no increased effort, breath sounds clear  Cardiovascular:  Regular rhythm, S1, S2, no murmur/rub/S3/S4, no carotid/femoral/abdominal bruit, radial/pedal pulses 2+, no edema  Abdomen:  Soft, non-tender, non-distended, normoactive bowel sounds, no HSM  Extremities: slight fulness right knee . + Pulse .  Skin:  No rash/erythema/ecchymoses/petechiae/wounds/abscess/warm/dry  Musculoskeletal: Intact ,  Neuro/Psych:  Alert, oriented, normal and symmetric strength in UEs, LEs .    Laboratory:                            11.6   10.19 )-----------( 167      ( 22 Sep 2021 06:52 )             32.7     09-22    136  |  101  |  18  ----------------------------<  184<H>  4.0   |  29  |  0.89    Ca    9.2      22 Sep 2021 06:52            CAPILLARY BLOOD GLUCOSE          PT/INR - ( 21 Sep 2021 10:13 )   PT: 12.3 sec;   INR: 1.06 ratio         PTT - ( 21 Sep 2021 10:13 )  PTT:30.9 sec      Imaging:      Assessment: S/P right knee replacement . HTN . Hyperlipidemia .      Plan: PT/OT . Pain control . DVT prophylaxis . Incentive spirometry . Current meds reviewed .

## 2021-09-22 NOTE — DISCHARGE NOTE NURSING/CASE MANAGEMENT/SOCIAL WORK - NSDCPEFALRISK_GEN_ALL_CORE
For information on Fall & injury Prevention, visit [email or web address]

## 2021-09-22 NOTE — DISCHARGE NOTE NURSING/CASE MANAGEMENT/SOCIAL WORK - PATIENT PORTAL LINK FT
You can access the FollowMyHealth Patient Portal offered by [facility] by registering at the following website: [email or web address]. By joining [facility]’s FollowMyHealth portal, you will also be able to view your health information using other applications (apps) compatible with our system.

## 2021-09-23 LAB — SURGICAL PATHOLOGY STUDY: SIGNIFICANT CHANGE UP

## 2021-09-24 DIAGNOSIS — M17.11 UNILATERAL PRIMARY OSTEOARTHRITIS, RIGHT KNEE: ICD-10-CM

## 2021-09-24 DIAGNOSIS — I10 ESSENTIAL (PRIMARY) HYPERTENSION: ICD-10-CM

## 2021-09-24 DIAGNOSIS — E78.5 HYPERLIPIDEMIA, UNSPECIFIED: ICD-10-CM

## 2021-09-24 DIAGNOSIS — E66.9 OBESITY, UNSPECIFIED: ICD-10-CM

## 2021-10-06 VITALS — WEIGHT: 193 LBS | HEIGHT: 68 IN | BODY MASS INDEX: 29.25 KG/M2

## 2021-10-13 ENCOUNTER — APPOINTMENT (OUTPATIENT)
Dept: ORTHOPEDIC SURGERY | Facility: CLINIC | Age: 61
End: 2021-10-13
Payer: COMMERCIAL

## 2021-10-13 VITALS — WEIGHT: 193 LBS | BODY MASS INDEX: 29.25 KG/M2 | HEIGHT: 68 IN

## 2021-10-13 PROCEDURE — 99024 POSTOP FOLLOW-UP VISIT: CPT

## 2021-10-13 NOTE — PROCEDURE
[de-identified] : Observation on incision dry, clean, intact, well healed. Method staple removing kit. Incision site Cleaned with iodine swab after staples are completely removed. Instructions Keep incision dry and clean, allowed to shower and pat site dry, do not rub dry, contact office is site becomes red, swollen, infected, or you develop a fever. \par \par

## 2021-10-13 NOTE — HISTORY OF PRESENT ILLNESS
[Clean/Dry/Intact] : clean, dry and intact [Healed] : healed [Swelling] : swollen [Doing Well] : is doing well [No Sign of Infection] : is showing no signs of infection [Adequate Pain Control] : has adequate pain control [Staples Removed] : staples were removed [Neuro Intact] : an unremarkable neurological exam [Vascular Intact] : ~T peripheral vascular exam normal [Chills] : no chills [Constipation] : no constipation [Diarrhea] : no diarrhea [Dysuria] : no dysuria [Fever] : no fever [Nausea] : no nausea [Vomiting] : no vomiting [Erythema] : not erythematous [Discharge] : absent of discharge [Dehiscence] : not dehisced [de-identified] : Post op visit [de-identified] : Patient presents today for F/U S/P right TKR done 3 weeks ago. Patient is doing well and undergoing P.T. with improvement. Takes Extra strength Tylenol for pain meds and Tylenol and Oxycodone prior to therapy and Aspirin for DVT prophylaxis. I went over post-op care and answered all questions. I also provided patient with surgical report for their records.\par  [de-identified] : ROm 0-90 [de-identified] : Continue P.T. pain management and DVT prophylaxis. F/U in 1 month with x-rays.\par

## 2021-11-17 ENCOUNTER — APPOINTMENT (OUTPATIENT)
Dept: ORTHOPEDIC SURGERY | Facility: CLINIC | Age: 61
End: 2021-11-17
Payer: COMMERCIAL

## 2021-11-17 VITALS — BODY MASS INDEX: 29.25 KG/M2 | HEIGHT: 68 IN | WEIGHT: 193 LBS

## 2021-11-17 PROCEDURE — 99024 POSTOP FOLLOW-UP VISIT: CPT

## 2021-11-17 PROCEDURE — 73562 X-RAY EXAM OF KNEE 3: CPT | Mod: RT

## 2021-11-17 NOTE — HISTORY OF PRESENT ILLNESS
[de-identified] : JOHN ARITA  is a 61 year old male who presents for follow up evaluation s/p right TKR at 8 weeks. Patient is doing well. Takes Oxycodone and Tylenol for PT. Patient has requested a work note stating that he will return in the 1st week of December.\par

## 2021-11-17 NOTE — DISCUSSION/SUMMARY
[de-identified] : Patient is doing well s/p right TKR at 8 weeks. He is anticipated to return to work on December 6th. Patient can continue with PT and activities as tolerated. All questions answered, understanding verbalized. Patient in agreement with plan of care. Patient may follow up in 6-8 weeks with xrays.

## 2021-11-17 NOTE — ADDENDUM
[FreeTextEntry1] : This note was written by Myra Tolbert on 11/17/2021 acting as scribe for Dr. Jarad Montoya M.D.\par \par I, Dr. Jarad Montoya, have read and attest that all the information, medical decision making and discharge instructions within are true and accurate.

## 2021-11-17 NOTE — PHYSICAL EXAM
[de-identified] : General appearance: well nourished and hydrated, pleasant, alert and oriented x 3, cooperative.\par HEENT: Normocephalic, EOM intact, Nasal septum midline, Oral cavity clear, External auditory canal clear.\par Cardiovascular: no apparent abnormalities, no lower leg edema, no varicosities, pedal pulses are palpable.\par Lymphatics Lymph nodes: none palpated, Lymphedema: not present.\par Neurologic: sensation is normal, no muscle weakness in upper or lower extremities, patella tendon reflexes intact .\par Dermatologic no apparent skin lesions, moist, warm, no rash.\par Spine:cervical spine appears normal and moves freely, thoracic spine appears normal and moves freely, lumbosacral spine appears normal and moves freely.\par Gait: nonantalgic.\par \par Right knee\par Inspection: no effusion or erythema.\par Wounds: healed midline incision\par Alignment: normal.\par Palpation: no specific tenderness on palpation.\par ROM active (in degrees): 0-120\par Ligamentous laxity: all ligaments appear stable,, negative ant. drawer test, negative post. drawer test, stable to varus stress test, stable to valgus stress test. negative Lachman's test, negative pivot shift test\par Meniscal Test: negative McMurrays, negative Michael.\par Patellofemoral Alignment Test: Q angle-, normal.\par Muscle Test: good quad strength. [de-identified] : Right knee xray, AP, lateral, merchant view taken at the office today demonstrates a total knee replacement in satisfactory position and alignment. No evidence of loosening. The patella sits in a central position.\par \par Left knee xray merchant view taken at the office today demonstrates good joint space and a well centered patella.

## 2022-01-14 ENCOUNTER — APPOINTMENT (OUTPATIENT)
Dept: ORTHOPEDIC SURGERY | Facility: CLINIC | Age: 62
End: 2022-01-14
Payer: COMMERCIAL

## 2022-01-14 VITALS — HEIGHT: 68 IN | BODY MASS INDEX: 29.25 KG/M2 | WEIGHT: 193 LBS

## 2022-01-14 DIAGNOSIS — M17.11 UNILATERAL PRIMARY OSTEOARTHRITIS, RIGHT KNEE: ICD-10-CM

## 2022-01-14 DIAGNOSIS — Z96.651 PRESENCE OF RIGHT ARTIFICIAL KNEE JOINT: ICD-10-CM

## 2022-01-14 PROCEDURE — 99213 OFFICE O/P EST LOW 20 MIN: CPT

## 2022-01-14 PROCEDURE — 73562 X-RAY EXAM OF KNEE 3: CPT | Mod: RT

## 2022-01-14 NOTE — PHYSICAL EXAM
[de-identified] : General appearance: well nourished and hydrated, pleasant, alert and oriented x 3, cooperative.\par HEENT: Normocephalic, EOM intact, Nasal septum midline, Oral cavity clear, External auditory canal clear.\par Cardiovascular: no apparent abnormalities, no lower leg edema, no varicosities, pedal pulses are palpable.\par Lymphatics Lymph nodes: none palpated, Lymphedema: not present.\par Neurologic: sensation is normal, no muscle weakness in upper or lower extremities, patella tendon reflexes intact .\par Dermatologic no apparent skin lesions, moist, warm, no rash.\par Spine:cervical spine appears normal and moves freely, thoracic spine appears normal and moves freely, lumbosacral spine appears normal and moves freely.\par Gait: nonantalgic.\par \par Right Knee\par Inspection: no effusion\par Wounds: healed midline incision\par Alignment: normal.\par Palpation: no specific tenderness on palpation.\par ROM: Active (in degrees): 0-135\par Ligamentous laxity (neg): negative ant. drawer test, negative post. drawer test, stable to varus stress test, stable to valgus stress test,\par Patellofemoral Alignment Test: Q angle-, normal.\par Muscle Test: good quad strength.\par Leg examination: calf is soft and non-tender. [de-identified] : Right knee xray, AP, lateral, merchant view taken at the office today demonstrates a total knee replacement in satisfactory position and alignment. No evidence of loosening. The patella sits in a central position.\par \par Left knee xray merchant view taken at the office today demonstrates good joint space and a well centered patella.

## 2022-01-14 NOTE — HISTORY OF PRESENT ILLNESS
[de-identified] : JOHN ARITA is a 61 year old male who presents for follow up evaluation s/p right TKR at 4 months. Pt is going to PT. C/p swelling, warmth and numbness.  He has a ski trip at the end of Feb.

## 2022-01-14 NOTE — DISCUSSION/SUMMARY
[de-identified] : Patient is doing well s/p right TKR at 4 months. Pt is happy and will be going skiing at the end of Feb. Patient can continue with PT and activities as tolerated. All questions answered, understanding verbalized. Patient in agreement with plan of care. I will see him back in 3 months with xrays.

## 2022-01-14 NOTE — ADDENDUM
[FreeTextEntry1] : This note was written by Myra Tolbert on 01/14/2022 acting as scribe for Dr. Jarad Montoya M.D.\par \par I, Dr. Jarad Montoya, have read and attest that all the information, medical decision making and discharge instructions within are true and accurate.

## 2022-04-11 ENCOUNTER — RX RENEWAL (OUTPATIENT)
Age: 62
End: 2022-04-11

## 2022-04-15 ENCOUNTER — APPOINTMENT (OUTPATIENT)
Dept: ORTHOPEDIC SURGERY | Facility: CLINIC | Age: 62
End: 2022-04-15

## 2022-07-03 ENCOUNTER — NON-APPOINTMENT (OUTPATIENT)
Age: 62
End: 2022-07-03

## 2022-07-10 ENCOUNTER — NON-APPOINTMENT (OUTPATIENT)
Age: 62
End: 2022-07-10
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
## 2023-03-03 NOTE — PATIENT PROFILE ADULT - FLU SEASON?
Procedure Request Form: Colonoscopy      Date Requested: 23  Patient: Gonzáles Poag  Patient : [birth year]   Referring Provider: Naz Finical, MD        Date of Procedure ______________________________       The above patient has informed us that they have completed their   most recent Colonoscopy at your facility  Please complete   this form and attach all corresponding procedure reports/results  Comments __________________________________________________________  ____________________________________________________________________  ____________________________________________________________________  ____________________________________________________________________    Facility Completing Procedure _________________________________________    Form Completed By (print name) _______________________________________      Signature __________________________________________________________      These reports are needed for  compliance  Please fax this completed form and a copy of the procedure report to our office located at [place] [address] as soon as possible to Fax [phone] keon Reid: Phone [phone]    We thank you for your assistance in treating our mutual patient     [address]  [phone] F [phone] Yes...

## 2023-08-17 ENCOUNTER — APPOINTMENT (OUTPATIENT)
Dept: UROLOGY | Facility: CLINIC | Age: 63
End: 2023-08-17
Payer: COMMERCIAL

## 2023-08-17 VITALS
HEART RATE: 71 BPM | DIASTOLIC BLOOD PRESSURE: 82 MMHG | BODY MASS INDEX: 30.31 KG/M2 | WEIGHT: 200 LBS | RESPIRATION RATE: 16 BRPM | HEIGHT: 68 IN | SYSTOLIC BLOOD PRESSURE: 157 MMHG

## 2023-08-17 DIAGNOSIS — N13.8 BENIGN PROSTATIC HYPERPLASIA WITH LOWER URINARY TRACT SYMPMS: ICD-10-CM

## 2023-08-17 DIAGNOSIS — R93.5 ABNORMAL FINDINGS ON DIAGNOSTIC IMAGING OF OTHER ABDOMINAL REGIONS, INCLUDING RETROPERITONEUM: ICD-10-CM

## 2023-08-17 DIAGNOSIS — N40.1 BENIGN PROSTATIC HYPERPLASIA WITH LOWER URINARY TRACT SYMPMS: ICD-10-CM

## 2023-08-17 DIAGNOSIS — R97.20 ELEVATED PROSTATE, SPECIFIC ANTIGEN [PSA]: ICD-10-CM

## 2023-08-17 PROCEDURE — 99204 OFFICE O/P NEW MOD 45 MIN: CPT

## 2023-08-17 RX ORDER — TADALAFIL 5 MG/1
5 TABLET ORAL
Refills: 0 | Status: ACTIVE | COMMUNITY

## 2023-08-17 RX ORDER — NAPROXEN 375 MG/1
375 TABLET, DELAYED RELEASE ORAL
Qty: 60 | Refills: 0 | Status: DISCONTINUED | COMMUNITY
Start: 2019-12-27 | End: 2023-08-17

## 2023-08-17 RX ORDER — OXYCODONE 5 MG/1
5 TABLET ORAL
Qty: 40 | Refills: 0 | Status: DISCONTINUED | COMMUNITY
Start: 2021-09-29 | End: 2023-08-17

## 2023-08-17 RX ORDER — DOCUSATE SODIUM 100 MG/1
100 CAPSULE ORAL 3 TIMES DAILY
Qty: 60 | Refills: 0 | Status: DISCONTINUED | COMMUNITY
Start: 2021-09-29 | End: 2023-08-17

## 2023-08-17 RX ORDER — ONDANSETRON 4 MG/1
4 TABLET ORAL 3 TIMES DAILY
Qty: 30 | Refills: 0 | Status: DISCONTINUED | COMMUNITY
Start: 2021-09-29 | End: 2023-08-17

## 2023-08-17 NOTE — LETTER BODY
[Dear  ___] : Dear  [unfilled], [Courtesy Letter:] : I had the pleasure of seeing your patient, [unfilled], in my office today. [Please see my note below.] : Please see my note below. [FreeTextEntry2] : Kings Castanon MD [address] [place] [address] [address] [place] [address] [FreeTextEntry3] : Sincerely,    Rocky Camilo MD, FACS Chief of Urology, [facility]  of Urology  [facility] [address] [place] [address] P: [phone] F: [phone] [facility]

## 2023-08-17 NOTE — HISTORY OF PRESENT ILLNESS
[FreeTextEntry1] : JOHN ARITA returns to the office today. He is a 63-year-old man who recently underwent a fusion biopsy with Dr. Win last week at [facility] in [place]. He has not gotten any results yet, he is going on Tuesday to review the results. He feels well since the biopsy, still notices some blood in his urine.  He had a PSA of 7.5ng/mL which prompted a prostate MRI which showed a prostate volume of 43cc and two PI-RADS 4 lesions.   No known family history of prostate cancer.   He is currently taking Tadalafil 5mg. He wakes up 1-2x a night. He reports a moderate stream.  He denies dysuria. There have been no episodes of retention or urinary tract infections. He has not required catheterization of his bladder.

## 2023-08-17 NOTE — ASSESSMENT
[FreeTextEntry1] : I reviewed the patient's prostate MRI images and report.  He has 2 lesions, PI-RADS category 4 and PI-RADS category 3.  No evidence of extraprostatic extension was noted.  Seminal vesicles and pelvic lymph nodes appeared within normal limits.  The prostate measured 43 cm³.  The patient had his biopsy a bit over 1 week ago but does not yet have the results.  He is here under the presumption that he likely has prostate cancer.  This may in fact be true but I would strongly recommend we review his biopsy results to help guide specific treatment recommendations that would be appropriate to his case.  I did take the opportunity of our visit today to discuss treatment potentials in general with the patient explaining that recommendations may shift depending on the biopsy findings.  We did review options of radiation therapy, radical prostatectomy, and focal ablation is all potentials that may be utilized although not called may be appropriate to his case.  We will request the outside slides for internal review.  I will then have a follow-up conversation with him once we have the results to review the potential different treatment options that may need to be considered if he is indeed diagnosed with prostate cancer.  We also discussed that some cases of prostate cancer may be appropriately managed with active surveillance.  He communicates his understanding and is in agreement.  I will be in touch with him once I receive his biopsy results.

## 2023-08-17 NOTE — PHYSICAL EXAM
[General Appearance - Well Developed] : well developed [General Appearance - Well Nourished] : well nourished [Normal Appearance] : normal appearance [Well Groomed] : well groomed [General Appearance - In No Acute Distress] : no acute distress [Edema] : no peripheral edema [Respiration, Rhythm And Depth] : normal respiratory rhythm and effort [Exaggerated Use Of Accessory Muscles For Inspiration] : no accessory muscle use [Abdomen Tenderness] : non-tender [Abdomen Soft] : soft [Costovertebral Angle Tenderness] : no ~M costovertebral angle tenderness [Urethral Meatus] : meatus normal [Urinary Bladder Findings] : the bladder was normal on palpation [Scrotum] : the scrotum was normal [Testes Mass (___cm)] : there were no testicular masses [Normal Station and Gait] : the gait and station were normal for the patient's age [] : no rash [No Focal Deficits] : no focal deficits [Oriented To Time, Place, And Person] : oriented to person, place, and time [Affect] : the affect was normal [Mood] : the mood was normal [Not Anxious] : not anxious

## 2023-08-22 PROBLEM — C61 ADENOCARCINOMA OF PROSTATE: Status: ACTIVE | Noted: 2023-08-22

## 2023-08-24 ENCOUNTER — APPOINTMENT (OUTPATIENT)
Dept: UROLOGY | Facility: CLINIC | Age: 63
End: 2023-08-24
Payer: COMMERCIAL

## 2023-08-24 VITALS
OXYGEN SATURATION: 98 % | TEMPERATURE: 98 F | HEART RATE: 68 BPM | BODY MASS INDEX: 30.16 KG/M2 | DIASTOLIC BLOOD PRESSURE: 78 MMHG | SYSTOLIC BLOOD PRESSURE: 133 MMHG | WEIGHT: 199 LBS | HEIGHT: 68 IN

## 2023-08-24 DIAGNOSIS — C61 MALIGNANT NEOPLASM OF PROSTATE: ICD-10-CM

## 2023-08-24 PROCEDURE — 99215 OFFICE O/P EST HI 40 MIN: CPT

## 2023-08-25 ENCOUNTER — RESULT REVIEW (OUTPATIENT)
Age: 63
End: 2023-08-25

## 2023-08-25 NOTE — LETTER CLOSING
[FreeTextEntry3] : Sincerely,    Rocky Camilo MD, FACS Chief of Urology, [facility]  of Urology  [facility] [address] [place] [address] P: [phone] F: [phone] [facility]

## 2023-08-25 NOTE — LETTER GREETING
[Dear  ___] : Dear  [unfilled], [Follow-Up] : Your patient, [unfilled] was seen in my office today for follow-up [Please see my note below.] : Please see my note below. [FreeTextEntry2] : Kings Castanon MD [address] [place] [address] [address] [place] [address]

## 2023-08-25 NOTE — HISTORY OF PRESENT ILLNESS
[FreeTextEntry1] : JOHN ARITA returns to the office today. He is a 63-year-old man who underwent a prostate biopsy with Dr. Win which showed Dominik 3+4 in the target lesion and numerous cores of [place] 3+3. His PSA was 7.5ng/mL which prompted a prostate MRI which showed a prostate volume of 43cc and two PI-RADS 4 lesions. He is here today to discuss next options.   He is currently on Tadalafil 5mg for nocturia x1-2 and a moderate stream.  He has no prior history of urinary retention or infection.  No prior history of catheterization of the bladder or prostate surgery.  He has spoken with his previous urologist about potential treatment options for consideration.  He was given a recommendation of radiation but was not offered surgery.  He also was recommended to have a PSMA PET scan.

## 2023-08-25 NOTE — DISEASE MANAGEMENT
[0-10] : 0 -10 ng/mL [Biopsy with Fusion] : Patient had a biopsy with fusion on [6] : Template Biopsy Dominik Score: 6 [7(3+4)] : Fusion Biopsy [place] Score: 7(3+4) [] : Patient had a Prostate MRI [4] : 4 [IIB] : IIB [BiopsyDate] : 08/23 [MeasuredProstateVolume] : 43 [TotalCores] : 18 [TotalPositiveCores] : 12

## 2023-08-31 ENCOUNTER — OUTPATIENT (OUTPATIENT)
Dept: OUTPATIENT SERVICES | Facility: HOSPITAL | Age: 63
LOS: 1 days | Discharge: HOME | End: 2023-08-31
Payer: COMMERCIAL

## 2023-08-31 DIAGNOSIS — Z98.890 OTHER SPECIFIED POSTPROCEDURAL STATES: Chronic | ICD-10-CM

## 2023-09-05 ENCOUNTER — APPOINTMENT (OUTPATIENT)
Dept: RADIATION ONCOLOGY | Facility: CLINIC | Age: 63
End: 2023-09-05
Payer: COMMERCIAL

## 2023-09-05 VITALS
RESPIRATION RATE: 17 BRPM | HEART RATE: 62 BPM | SYSTOLIC BLOOD PRESSURE: 135 MMHG | WEIGHT: 200.62 LBS | BODY MASS INDEX: 30.41 KG/M2 | TEMPERATURE: 97.16 F | OXYGEN SATURATION: 99 % | HEIGHT: 68 IN | DIASTOLIC BLOOD PRESSURE: 84 MMHG

## 2023-09-05 DIAGNOSIS — Z87.891 PERSONAL HISTORY OF NICOTINE DEPENDENCE: ICD-10-CM

## 2023-09-05 DIAGNOSIS — Z80.8 FAMILY HISTORY OF MALIGNANT NEOPLASM OF OTHER ORGANS OR SYSTEMS: ICD-10-CM

## 2023-09-05 DIAGNOSIS — U07.1 COVID-19: ICD-10-CM

## 2023-09-05 DIAGNOSIS — Z23 ENCOUNTER FOR IMMUNIZATION: ICD-10-CM

## 2023-09-05 DIAGNOSIS — Z80.3 FAMILY HISTORY OF MALIGNANT NEOPLASM OF BREAST: ICD-10-CM

## 2023-09-05 PROCEDURE — 99204 OFFICE O/P NEW MOD 45 MIN: CPT | Mod: 25

## 2023-09-05 RX ORDER — TELMISARTAN AND HYDROCHLOROTHIAZIDE 80; 25 MG/1; MG/1
80-25 TABLET ORAL
Qty: 7 | Refills: 0 | Status: ACTIVE | COMMUNITY
Start: 2023-06-09

## 2023-09-05 RX ORDER — BENZONATATE 100 MG/1
100 CAPSULE ORAL
Qty: 30 | Refills: 0 | Status: DISCONTINUED | COMMUNITY
Start: 2023-03-09

## 2023-09-05 RX ORDER — ROSUVASTATIN CALCIUM 40 MG/1
40 TABLET, FILM COATED ORAL
Qty: 90 | Refills: 0 | Status: ACTIVE | COMMUNITY
Start: 2023-06-09

## 2023-09-05 RX ORDER — PREDNISONE 20 MG/1
20 TABLET ORAL
Qty: 6 | Refills: 0 | Status: DISCONTINUED | COMMUNITY
Start: 2023-05-01

## 2023-09-05 NOTE — HISTORY OF PRESENT ILLNESS
[FreeTextEntry1] : Mr. Abraham is a 63-year-old male who presents in consultation for consideration of radiation therapy.  He is accompanied by his wife for today's visit.   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 95% max. involvement. PSA 7.57 ng/mL. MRI T2, no DENNIS or SV involvement.   PSA Trend: 6/16/23 - 7.57 ng/mL  HPI: 6/2023 - found to have PSA of 7.57 ng/mL.    6/27/23 - Prostate MRI:  Prostate volume 43 cc.  PIRADS 4 foci involving the bilateral apical transition zones anteriorly.  Lesion #1, left apex transition zone anterior, 2.1 x 0.9 cm, no DENNIS. Lesion #2, right apex transition zone anterior, 1.8 x 1.4 cm, no imaging evidence of DENNIS however there is broad anterior capsule abutment. No seminal vesicle involvement, lymphadenopathy, or osseous metastases suggested.   8/9/23 - underwent Prostate Biopsy with Dr. Win ([facility]):  Pathology - [facility] Slide Review: Adenocarcinoma of Prostate in 13/18 cores.  Dominik score 3+4=7 in 1/18 cores and Burton score 3+3=6 in 12/18 cores. 95% max. involvement. One core with Atypical small acinar proliferation (ASAP).   8/24/23 - saw Dr. Camilo (urology) .... His previous urologist had discussed treatment options and recommended PSMA PET scan. Discussed biopsy results and that PSMA PET isn't indicated based on absence of high-risk features and no evidence of extra prostatic extension. Recommend that he have whole gland therapy and reviewed treatment options.  Patient not interested in pursuing surgery and had hoped to be candidate for focal therapy. Do not recommend focal therapy due to volume of samples positive.  Referral to radiation oncology made.    9/5/23 - presents in consultation for discussion of radiation therapy options. Mr. Abraham feels well today, denies any complaints.  He looks forward to next steps in his treatment for his prostate cancer.  He has nocturia 2x, some occasional weak stream, denies any dysuria.  Denies any bowel issues, has never had a colonoscopy. He uses Tadalfil.

## 2023-09-05 NOTE — PHYSICAL EXAM
[Outer Ear] : the ears and nose were normal in appearance [Hearing Threshold Finger Rub Not [place]] : hearing was normal [Abdomen Soft] : soft [Nondistended] : nondistended [Abdomen Tenderness] : non-tender [Normal External Genitalia] : normal external genitalia without lesions [Inguinal Lymph Nodes Enlarged Bilaterally] : inguinal [Normal] : oriented to person, place and time, the affect was normal, the mood was normal and not anxious

## 2023-09-05 NOTE — REVIEW OF SYSTEMS
[Patient Intake Form Reviewed] : Patient intake form was reviewed [Nocturia] : nocturia [IPSS Score (0-40): ___] : IPSS score: [unfilled] [EPIC-CP Score (0-60): ___] : EPIC-CP score: [unfilled] [Negative] : Heme/Lymph [Constipation: Grade 0] : Constipation: Grade 0 [Diarrhea: Grade 0] : Diarrhea: Grade 0 [Hematuria: Grade 0] : Hematuria: Grade 0 [Urinary Incontinence: Grade 0] : Urinary Incontinence: Grade 0  [Urinary Retention: Grade 0] : Urinary Retention: Grade 0 [Urinary Tract Pain: Grade 0] : Urinary Tract Pain: Grade 0 [Urinary Urgency: Grade 0] : Urinary Urgency: Grade 0 [Urinary Frequency: Grade 1 - Present] : Urinary Frequency: Grade 1 - Present [Ejaculation Disorder: Grade 0] : Ejaculation Disorder: Grade 0 [Erectile Dysfunction: Grade 0] : Erectile Dysfunction: Grade 0 [FreeTextEntry9] :  nocturia 2x

## 2023-09-05 NOTE — DISEASE MANAGEMENT
[0-10] : 0 -10 ng/mL [Biopsy with Fusion] : Patient had a biopsy with fusion on [6] : Template Biopsy Dominik Score: 6 [7(3+4)] : Fusion Biopsy [place] Score: 7(3+4) [] : Patient had a Prostate MRI [4] : 4 [1] : T1 [c] : c [0] : N0 [X] : MX [IIB] : IIB [BiopsyDate] : 8/9/2023 [MeasuredProstateVolume] : 43 [TotalCores] : 18 [TotalPositiveCores] : 13 [MaxCoreInvolvement] : 95

## 2023-09-06 ENCOUNTER — APPOINTMENT (OUTPATIENT)
Dept: GASTROENTEROLOGY | Facility: CLINIC | Age: 63
End: 2023-09-06
Payer: COMMERCIAL

## 2023-09-06 ENCOUNTER — NON-APPOINTMENT (OUTPATIENT)
Age: 63
End: 2023-09-06

## 2023-09-06 VITALS
SYSTOLIC BLOOD PRESSURE: 110 MMHG | OXYGEN SATURATION: 98 % | DIASTOLIC BLOOD PRESSURE: 75 MMHG | WEIGHT: 198 LBS | BODY MASS INDEX: 30.01 KG/M2 | HEIGHT: 68 IN | HEART RATE: 75 BPM

## 2023-09-06 DIAGNOSIS — Z12.11 ENCOUNTER FOR SCREENING FOR MALIGNANT NEOPLASM OF COLON: ICD-10-CM

## 2023-09-06 PROCEDURE — 99203 OFFICE O/P NEW LOW 30 MIN: CPT

## 2023-09-06 NOTE — ASSESSMENT
[FreeTextEntry1] : This is a pleasant 63-year-old man presenting for colon cancer screening evaluation.  I explained to him the risks, alternatives and benefits to a colonoscopy.  Risk including but not limited to bleeding, perforation, infection and adverse medication reaction.  Questions were answered.  He stated understanding.

## 2023-09-06 NOTE — HISTORY OF PRESENT ILLNESS
[FreeTextEntry1] : This is a pleasant 63-year-old man recently diagnosed with prostate cancer awaiting radiation therapy presenting for colon cancer screening evaluation.  He has never had a lower GI evaluation.  He denies family history of colon cancer or colon polyps.  He reports chronic symptoms of right upper quadrant discomfort, intermittent in nature, sometimes radiates to the back.  The right upper quadrant discomfort is not associated with any other GI symptoms such as nausea, vomiting or change in bowel habits.  The discomfort is described as 5 out of 10.  He has had extensive GI work-up in the past from prior gastroenterologist ruling out biliary cause.  He denies GERD, dysphagia or odynophagia.  He denies changes in bowel habits.  He denies rectal bleeding or melena.  He denies weight loss or anemia.

## 2023-09-15 ENCOUNTER — APPOINTMENT (OUTPATIENT)
Dept: GASTROENTEROLOGY | Facility: AMBULATORY MEDICAL SERVICES | Age: 63
End: 2023-09-15
Payer: COMMERCIAL

## 2023-09-15 DIAGNOSIS — K29.80 DUODENITIS W/OUT BLEEDING: ICD-10-CM

## 2023-09-15 PROCEDURE — 45385 COLONOSCOPY W/LESION REMOVAL: CPT | Mod: 33

## 2023-09-15 PROCEDURE — 43239 EGD BIOPSY SINGLE/MULTIPLE: CPT

## 2023-09-15 RX ORDER — OMEPRAZOLE 20 MG/1
20 CAPSULE, DELAYED RELEASE ORAL DAILY
Qty: 30 | Refills: 5 | Status: ACTIVE | COMMUNITY
Start: 2023-09-15 | End: 1900-01-01

## 2023-09-22 ENCOUNTER — OUTPATIENT (OUTPATIENT)
Dept: OUTPATIENT SERVICES | Facility: HOSPITAL | Age: 63
LOS: 1 days | End: 2023-09-22
Payer: COMMERCIAL

## 2023-09-22 ENCOUNTER — APPOINTMENT (OUTPATIENT)
Dept: NUCLEAR MEDICINE | Facility: IMAGING CENTER | Age: 63
End: 2023-09-22
Payer: COMMERCIAL

## 2023-09-22 DIAGNOSIS — Z98.890 OTHER SPECIFIED POSTPROCEDURAL STATES: Chronic | ICD-10-CM

## 2023-09-22 DIAGNOSIS — C61 MALIGNANT NEOPLASM OF PROSTATE: ICD-10-CM

## 2023-09-22 PROCEDURE — 78816 PET IMAGE W/CT FULL BODY: CPT | Mod: 26

## 2023-09-22 PROCEDURE — 78816 PET IMAGE W/CT FULL BODY: CPT

## 2023-09-22 PROCEDURE — A9595: CPT

## 2023-09-25 ENCOUNTER — TRANSCRIPTION ENCOUNTER (OUTPATIENT)
Age: 63
End: 2023-09-25

## 2023-09-25 ENCOUNTER — NON-APPOINTMENT (OUTPATIENT)
Age: 63
End: 2023-09-25

## 2023-09-25 RX ORDER — SODIUM PICOSULFATE, MAGNESIUM OXIDE, AND ANHYDROUS CITRIC ACID 12; 3.5; 1 G/175ML; G/175ML; MG/175ML
10-3.5-12 MG-GM LIQUID ORAL
Qty: 2 | Refills: 0 | Status: DISCONTINUED | COMMUNITY
Start: 2023-09-06 | End: 2023-09-25

## 2023-09-26 ENCOUNTER — TRANSCRIPTION ENCOUNTER (OUTPATIENT)
Age: 63
End: 2023-09-26

## 2023-09-26 PROCEDURE — 77263 THER RADIOLOGY TX PLNG CPLX: CPT

## 2023-09-26 RX ORDER — SODIUM PHOSPHATE, DIBASIC AND SODIUM PHOSPHATE, MONOBASIC 7; 19 G/230ML; G/230ML
ENEMA RECTAL
Qty: 1 | Refills: 0 | Status: ACTIVE | COMMUNITY
Start: 2023-09-26 | End: 1900-01-01

## 2023-09-26 RX ORDER — AMOXICILLIN AND CLAVULANATE POTASSIUM 875; 125 MG/1; MG/1
875-125 TABLET, COATED ORAL
Qty: 6 | Refills: 0 | Status: ACTIVE | COMMUNITY
Start: 2023-09-26 | End: 1900-01-01

## 2023-09-27 ENCOUNTER — TRANSCRIPTION ENCOUNTER (OUTPATIENT)
Age: 63
End: 2023-09-27

## 2023-12-07 NOTE — DISCHARGE NOTE PROVIDER - REASON FOR ADMISSION
Bed: ED07  Expected date:   Expected time:   Means of arrival:   Comments:  Gabrielle [age] Year Old Male  
Pt escorted out to lobby via wheelchair. Pt requesting to use [employer] phone to obtain ride. Pt's single boot and watch returned to patient.  
Pt searched by . Pt declined to change into behavioral scrubs. Pt has 1 boot and watch  that were placed in belongings bag and put in DEC office.   
Elective R TKA

## 2024-03-04 NOTE — PHYSICAL THERAPY INITIAL EVALUATION ADULT - ADDITIONAL COMMENTS
03/04/2024 Patricia reviewing referral    Pt lives in a private home with no steps to enter. Once inside there are 5 steps with 1 handrail to R to 2nd floor. Bathroom is a walk in shower with no grab bars, both fixed and retractable shower head, comfort height seat. Pt reporting that a 3:1 commode will not fit, will get raised toilet seat with arms privately. Pt has a cane, no other dme. Pain is 7/10 at rest and can increase to 10/10 with activity. Pt takes advil prn, reports no adverse reactions to pain medications, goes to PT 2x week, no falls, no buckling. Pt wears reading glasses, is R hand dominant, drives, no hearing impairments, still working.
